# Patient Record
Sex: MALE | Race: WHITE | Employment: STUDENT | URBAN - METROPOLITAN AREA
[De-identification: names, ages, dates, MRNs, and addresses within clinical notes are randomized per-mention and may not be internally consistent; named-entity substitution may affect disease eponyms.]

---
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
## 2023-03-08 ENCOUNTER — OFFICE VISIT (OUTPATIENT)
Dept: FAMILY MEDICINE CLINIC | Facility: CLINIC | Age: 18
End: 2023-03-08
Payer: COMMERCIAL

## 2023-03-08 VITALS
TEMPERATURE: 98 F | RESPIRATION RATE: 18 BRPM | OXYGEN SATURATION: 99 % | SYSTOLIC BLOOD PRESSURE: 108 MMHG | BODY MASS INDEX: 38 KG/M2 | WEIGHT: 275.13 LBS | HEART RATE: 102 BPM | DIASTOLIC BLOOD PRESSURE: 72 MMHG

## 2023-03-08 DIAGNOSIS — N45.1 EPIDIDYMITIS: Primary | ICD-10-CM

## 2023-03-08 DIAGNOSIS — N50.811 TESTICULAR PAIN, RIGHT: ICD-10-CM

## 2023-03-08 PROCEDURE — 3074F SYST BP LT 130 MM HG: CPT | Performed by: FAMILY MEDICINE

## 2023-03-08 PROCEDURE — 99214 OFFICE O/P EST MOD 30 MIN: CPT | Performed by: FAMILY MEDICINE

## 2023-03-08 PROCEDURE — 3078F DIAST BP <80 MM HG: CPT | Performed by: FAMILY MEDICINE

## 2023-03-08 RX ORDER — CIPROFLOXACIN 500 MG/1
500 TABLET, FILM COATED ORAL 2 TIMES DAILY
Qty: 14 TABLET | Refills: 0 | Status: SHIPPED | OUTPATIENT
Start: 2023-03-08 | End: 2023-03-15

## 2023-03-08 RX ORDER — CIPROFLOXACIN 500 MG/1
500 TABLET, FILM COATED ORAL 2 TIMES DAILY
Qty: 14 TABLET | Refills: 0 | Status: SHIPPED | OUTPATIENT
Start: 2023-03-08 | End: 2023-03-08

## 2023-09-26 ENCOUNTER — MED REC SCAN ONLY (OUTPATIENT)
Dept: FAMILY MEDICINE CLINIC | Facility: CLINIC | Age: 18
End: 2023-09-26

## 2023-12-20 ENCOUNTER — OFFICE VISIT (OUTPATIENT)
Dept: FAMILY MEDICINE CLINIC | Facility: CLINIC | Age: 18
End: 2023-12-20
Payer: COMMERCIAL

## 2023-12-20 VITALS
DIASTOLIC BLOOD PRESSURE: 80 MMHG | TEMPERATURE: 98 F | BODY MASS INDEX: 40.09 KG/M2 | SYSTOLIC BLOOD PRESSURE: 124 MMHG | OXYGEN SATURATION: 99 % | HEIGHT: 70 IN | RESPIRATION RATE: 18 BRPM | HEART RATE: 84 BPM | WEIGHT: 280 LBS

## 2023-12-20 DIAGNOSIS — J45.909 ASTHMA, UNSPECIFIED ASTHMA SEVERITY, UNSPECIFIED WHETHER COMPLICATED, UNSPECIFIED WHETHER PERSISTENT: ICD-10-CM

## 2023-12-20 DIAGNOSIS — R09.81 SINUS CONGESTION: Primary | ICD-10-CM

## 2023-12-20 PROCEDURE — 3079F DIAST BP 80-89 MM HG: CPT | Performed by: NURSE PRACTITIONER

## 2023-12-20 PROCEDURE — 3008F BODY MASS INDEX DOCD: CPT | Performed by: NURSE PRACTITIONER

## 2023-12-20 PROCEDURE — 3074F SYST BP LT 130 MM HG: CPT | Performed by: NURSE PRACTITIONER

## 2023-12-20 PROCEDURE — 99213 OFFICE O/P EST LOW 20 MIN: CPT | Performed by: NURSE PRACTITIONER

## 2023-12-20 RX ORDER — AZITHROMYCIN 250 MG/1
TABLET, FILM COATED ORAL
Qty: 6 TABLET | Refills: 0 | Status: SHIPPED | OUTPATIENT
Start: 2023-12-20 | End: 2023-12-25

## 2023-12-20 RX ORDER — MELOXICAM 15 MG/1
TABLET ORAL
COMMUNITY
Start: 2023-10-27

## 2023-12-20 RX ORDER — TIZANIDINE HYDROCHLORIDE 4 MG/1
1 CAPSULE, GELATIN COATED ORAL 3 TIMES DAILY
COMMUNITY

## 2023-12-20 RX ORDER — ALBUTEROL SULFATE 90 UG/1
2 AEROSOL, METERED RESPIRATORY (INHALATION) EVERY 4 HOURS PRN
Qty: 18 G | Refills: 0 | Status: SHIPPED | OUTPATIENT
Start: 2023-12-20 | End: 2024-07-17

## 2024-02-02 ENCOUNTER — MED REC SCAN ONLY (OUTPATIENT)
Dept: FAMILY MEDICINE CLINIC | Facility: CLINIC | Age: 19
End: 2024-02-02

## 2024-08-15 ENCOUNTER — OFFICE VISIT (OUTPATIENT)
Dept: FAMILY MEDICINE CLINIC | Facility: CLINIC | Age: 19
End: 2024-08-15
Payer: COMMERCIAL

## 2024-08-15 VITALS
HEIGHT: 68 IN | BODY MASS INDEX: 42.89 KG/M2 | TEMPERATURE: 98 F | OXYGEN SATURATION: 99 % | WEIGHT: 283 LBS | RESPIRATION RATE: 16 BRPM | DIASTOLIC BLOOD PRESSURE: 72 MMHG | SYSTOLIC BLOOD PRESSURE: 112 MMHG | HEART RATE: 94 BPM

## 2024-08-15 DIAGNOSIS — Z00.00 ROUTINE HEALTH MAINTENANCE: ICD-10-CM

## 2024-08-15 DIAGNOSIS — N34.2 URETHRITIS: ICD-10-CM

## 2024-08-15 DIAGNOSIS — H15.101 EPISCLERITIS OF RIGHT EYE: Primary | ICD-10-CM

## 2024-08-15 PROBLEM — M54.16 LUMBAR RADICULOPATHY: Status: ACTIVE | Noted: 2023-07-28

## 2024-08-15 LAB
ALBUMIN SERPL-MCNC: 5.1 G/DL (ref 3.2–4.8)
ALBUMIN/GLOB SERPL: 1.6 {RATIO} (ref 1–2)
ALP LIVER SERPL-CCNC: 68 U/L
ALT SERPL-CCNC: 44 U/L
ANION GAP SERPL CALC-SCNC: 8 MMOL/L (ref 0–18)
AST SERPL-CCNC: 43 U/L (ref ?–34)
BASOPHILS # BLD AUTO: 0.07 X10(3) UL (ref 0–0.2)
BASOPHILS NFR BLD AUTO: 1.1 %
BILIRUB SERPL-MCNC: 0.5 MG/DL (ref 0.3–1.2)
BUN BLD-MCNC: 11 MG/DL (ref 9–23)
CALCIUM BLD-MCNC: 10.6 MG/DL (ref 8.7–10.4)
CHLORIDE SERPL-SCNC: 105 MMOL/L (ref 98–112)
CHOLEST SERPL-MCNC: 233 MG/DL (ref ?–200)
CO2 SERPL-SCNC: 26 MMOL/L (ref 21–32)
CREAT BLD-MCNC: 1.07 MG/DL
EGFRCR SERPLBLD CKD-EPI 2021: 101 ML/MIN/1.73M2 (ref 60–?)
EOSINOPHIL # BLD AUTO: 0.1 X10(3) UL (ref 0–0.7)
EOSINOPHIL NFR BLD AUTO: 1.6 %
ERYTHROCYTE [DISTWIDTH] IN BLOOD BY AUTOMATED COUNT: 12.2 %
FASTING PATIENT LIPID ANSWER: YES
FASTING STATUS PATIENT QL REPORTED: YES
GLOBULIN PLAS-MCNC: 3.1 G/DL (ref 2–3.5)
GLUCOSE BLD-MCNC: 92 MG/DL (ref 70–99)
HCT VFR BLD AUTO: 48.8 %
HDLC SERPL-MCNC: 48 MG/DL (ref 40–59)
HGB BLD-MCNC: 16.6 G/DL
IMM GRANULOCYTES # BLD AUTO: 0.03 X10(3) UL (ref 0–1)
IMM GRANULOCYTES NFR BLD: 0.5 %
LDLC SERPL CALC-MCNC: 161 MG/DL (ref ?–100)
LYMPHOCYTES # BLD AUTO: 2.5 X10(3) UL (ref 1–4)
LYMPHOCYTES NFR BLD AUTO: 39.6 %
MCH RBC QN AUTO: 30.4 PG (ref 26–34)
MCHC RBC AUTO-ENTMCNC: 34 G/DL (ref 31–37)
MCV RBC AUTO: 89.4 FL
MONOCYTES # BLD AUTO: 0.45 X10(3) UL (ref 0.1–1)
MONOCYTES NFR BLD AUTO: 7.1 %
NEUTROPHILS # BLD AUTO: 3.17 X10 (3) UL (ref 1.5–7.7)
NEUTROPHILS # BLD AUTO: 3.17 X10(3) UL (ref 1.5–7.7)
NEUTROPHILS NFR BLD AUTO: 50.1 %
NONHDLC SERPL-MCNC: 185 MG/DL (ref ?–130)
OSMOLALITY SERPL CALC.SUM OF ELEC: 287 MOSM/KG (ref 275–295)
PLATELET # BLD AUTO: 357 10(3)UL (ref 150–450)
POTASSIUM SERPL-SCNC: 4.2 MMOL/L (ref 3.5–5.1)
PROT SERPL-MCNC: 8.2 G/DL (ref 5.7–8.2)
RBC # BLD AUTO: 5.46 X10(6)UL
SODIUM SERPL-SCNC: 139 MMOL/L (ref 136–145)
T4 FREE SERPL-MCNC: 1.5 NG/DL (ref 0.8–1.7)
TRIGL SERPL-MCNC: 134 MG/DL (ref 30–149)
TSI SER-ACNC: 1.49 MIU/ML (ref 0.55–4.78)
VLDLC SERPL CALC-MCNC: 26 MG/DL (ref 0–30)
WBC # BLD AUTO: 6.3 X10(3) UL (ref 4–11)

## 2024-08-15 PROCEDURE — 3078F DIAST BP <80 MM HG: CPT | Performed by: FAMILY MEDICINE

## 2024-08-15 PROCEDURE — 3008F BODY MASS INDEX DOCD: CPT | Performed by: FAMILY MEDICINE

## 2024-08-15 PROCEDURE — 99395 PREV VISIT EST AGE 18-39: CPT | Performed by: FAMILY MEDICINE

## 2024-08-15 PROCEDURE — 3074F SYST BP LT 130 MM HG: CPT | Performed by: FAMILY MEDICINE

## 2024-08-15 PROCEDURE — 84439 ASSAY OF FREE THYROXINE: CPT | Performed by: FAMILY MEDICINE

## 2024-08-15 PROCEDURE — 84443 ASSAY THYROID STIM HORMONE: CPT | Performed by: FAMILY MEDICINE

## 2024-08-15 PROCEDURE — 80061 LIPID PANEL: CPT | Performed by: FAMILY MEDICINE

## 2024-08-15 PROCEDURE — 80053 COMPREHEN METABOLIC PANEL: CPT | Performed by: FAMILY MEDICINE

## 2024-08-15 PROCEDURE — 85025 COMPLETE CBC W/AUTO DIFF WBC: CPT | Performed by: FAMILY MEDICINE

## 2024-08-15 RX ORDER — ALBUTEROL SULFATE 90 UG/1
2 AEROSOL, METERED RESPIRATORY (INHALATION) EVERY 4 HOURS PRN
COMMUNITY

## 2024-08-15 RX ORDER — TAMSULOSIN HYDROCHLORIDE 0.4 MG/1
1 CAPSULE ORAL
COMMUNITY
Start: 2024-03-06 | End: 2024-08-15 | Stop reason: ALTCHOICE

## 2024-08-15 NOTE — PROGRESS NOTES
Satish Wolf is a 19 year old male who presents for a complete physical exam.   HPI:   Pt complains of nothing at thi time. He passed a kidney stone earlier this year. He also had a low back injury and did not have to have surgery.he has been trying to lose weight admits he's a . Not really tracking calories and is trying to get more exercise, has lost 7 pounds. Has some physical restrictions due to his back. Also continues to have episodes of scleritis, and urethritis. He was worked up a number of years ago for connective tissue disorder but, it all came back negative. Has not had any labs for a while.  Immunization History   Administered Date(s) Administered    Covid-19 Vaccine Pfizer 30 mcg/0.3 ml 09/03/2021, 09/24/2021    DTAP 02/02/2005, 08/04/2008    DTAP/HEP B/IPV Combined 10/08/2003, 12/08/2003, 02/09/2004    Hib, Unspecified Formulation 10/08/2003, 12/08/2003, 02/09/2004, 11/10/2004    IPV 08/04/2008    Influenza 10/08/2003, 12/08/2003, 02/09/2004, 11/10/2004    MMR 11/10/2004, 08/04/2008    Pneumococcal (Prevnar 7) 10/08/2003, 02/26/2004, 08/09/2004    Varicella 02/02/2005, 08/04/2008     Wt Readings from Last 6 Encounters:   12/20/23 280 lb (127 kg)   03/08/23 275 lb 2 oz (124.8 kg) (>99%, Z= 2.77)*   12/24/22 260 lb (117.9 kg) (>99%, Z= 2.56)*   12/21/22 260 lb (117.9 kg) (>99%, Z= 2.56)*   10/12/22 263 lb 14.3 oz (119.7 kg) (>99%, Z= 2.62)*   10/12/22 264 lb (119.7 kg) (>99%, Z= 2.62)*     * Growth percentiles are based on CDC (Boys, 2-20 Years) data.     There is no height or weight on file to calculate BMI.     Lab Results   Component Value Date    GLU 86 07/13/2022    GLU 77 05/13/2016     No results found for: \"CHOLEST\"  No results found for: \"HDL\"  No results found for: \"LDL\"  Lab Results   Component Value Date    AST 14 (L) 07/13/2022    AST 25 05/13/2016     Lab Results   Component Value Date    ALT 23 07/13/2022    ALT 21 05/13/2016     No results found for: \"PSA\"      Current Outpatient Medications   Medication Sig Dispense Refill    tamsulosin 0.4 MG Oral Cap Take 1 capsule (0.4 mg total) by mouth After dinner.      albuterol 108 (90 Base) MCG/ACT Inhalation Aero Soln Inhale 2 puffs into the lungs every 4 (four) hours as needed.        Past Medical History:    Asthma (HCC)    Seizures (HCC)      Past Surgical History:   Procedure Laterality Date    Appendectomy  11/5/15    [facility] Dr. Moore    Appendectomy      Tonsillectomy        Family History   Problem Relation Age of Onset    Heart Disease Paternal Grandfather     Other (rheumatoid arthritis) Father     Cancer Neg     Stroke Neg       Social History:  Social History     Socioeconomic History    Marital status: Single   Tobacco Use    Smoking status: Never    Smokeless tobacco: Never   Vaping Use    Vaping status: Never Used   Substance and Sexual Activity    Alcohol use: Never    Drug use: Never     Social Determinants of Health      [facility]    Social Connections    Received from [facility]    Housing Stability      Occ: student. : single. Children: none.   Exercise:  4 times per week.  Diet: watches minimally     REVIEW OF SYSTEMS:   GENERAL: feels well otherwise  SKIN: denies any unusual skin lesions  EYES:denies blurred vision or double vision  HEENT: denies nasal congestion, sinus pain or ST  LUNGS: denies shortness of breath with exertion  CARDIOVASCULAR: denies chest pain on exertion  GI: denies abdominal pain,denies heartburn  : denies nocturia or changes in stream  MUSCULOSKELETAL: denies back pain  NEURO: denies headaches  PSYCHE: denies depression or anxiety  HEMATOLOGIC: denies hx of anemia  ENDOCRINE: denies thyroid history  ALL/ASTHMA: denies hx of allergy or asthma    EXAM:   There were no vitals taken for this visit.  There is no height or weight on file to calculate BMI.   GENERAL: well developed, well  nourished,in no apparent distress  SKIN: no rashes,no suspicious lesions  HEENT: atraumatic, normocephalic,ears and throat are clear  EYES:PERRLA, EOMI, normal optic disk,conjunctiva are clear  NECK: supple,no adenopathy,no bruits  CHEST: no chest tenderness  LUNGS: clear to auscultation  CARDIO: RRR without murmur  GI: good BS's,no masses, HSM or tenderness  : two descended testes,no masses,no hernia,no penile lesions  MUSCULOSKELETAL: back is not tender,FROM of the back  EXTREMITIES: no cyanosis, clubbing or edema  NEURO: Oriented times three,cranial nerves are intact,motor and sensory are grossly intact    ASSESSMENT AND PLAN:   Satish Wolf is a 19 year old male who presents for a complete physical exam.  Pt's weight is There is no height or weight on file to calculate BMI., recommended low fat diet and aerobic exercise 30 minutes three times weekly. Health maintenance, will check fasting Lipids, CMP, CBC and PSA. Pt referred for screening colonoscopy. Pt info handouts given for: exercise, low fat diet, testicular self exam and prostate cancer screening. The patient indicates understanding of these issues and agrees to the plan.  The patient is asked to return for CPX in 1 year  Encounter Diagnoses   Name Primary?    Episcleritis of right eye Yes    Urethritis     Routine health maintenance        Orders Placed This Encounter   Procedures    CBC W Differential W Platelet [E]    Comp Metabolic Panel (14) [E]    Lipid Panel [E]    TSH and Free T4 [E]     INSTRUCTED TO TRY AND GET 5 SERVINGS OF FRUITS AND VEGETABLES DAILY keep caloric intake between 6478-5025 calories daily, try and get 100 grams of protein daily  4 SERVINGS OF WATER  3 SERVINGS OF LOW FAT DAIRY DAILY  AND 60 MINUTES OF PHYSICAL ACTIVITY A DAY  RTC in 8 weeks to recheck weight      Imaging & Consults:  RHEUMATOLOGY - INTERNAL  OP REFERRAL TO OPHTHALMOLOGY  .

## 2024-08-16 DIAGNOSIS — E78.1 PURE HYPERTRIGLYCERIDEMIA: Primary | ICD-10-CM

## 2024-08-16 DIAGNOSIS — E78.00 PURE HYPERCHOLESTEROLEMIA: ICD-10-CM

## 2025-01-08 ENCOUNTER — HOSPITAL ENCOUNTER (OUTPATIENT)
Age: 20
Discharge: HOME | End: 2025-01-08
Payer: COMMERCIAL

## 2025-01-08 VITALS
HEIGHT: 70 IN | HEART RATE: 90 BPM | WEIGHT: 270 LBS | BODY MASS INDEX: 38.65 KG/M2 | RESPIRATION RATE: 20 BRPM | TEMPERATURE: 99 F | OXYGEN SATURATION: 98 % | DIASTOLIC BLOOD PRESSURE: 50 MMHG | SYSTOLIC BLOOD PRESSURE: 102 MMHG

## 2025-01-08 DIAGNOSIS — A08.4 VIRAL GASTROENTERITIS: Primary | ICD-10-CM

## 2025-01-08 LAB
#MXD IC: 0.4 X10ˆ3/UL (ref 0.1–1)
BUN BLD-MCNC: 12 MG/DL (ref 7–18)
CHLORIDE BLD-SCNC: 104 MMOL/L (ref 98–112)
CLARITY UR: CLEAR
CO2 BLD-SCNC: 24 MMOL/L (ref 21–32)
COLOR UR: YELLOW
CREAT BLD-MCNC: 1.1 MG/DL
EGFRCR SERPLBLD CKD-EPI 2021: 98 ML/MIN/1.73M2 (ref 60–?)
GLUCOSE BLD-MCNC: 93 MG/DL (ref 70–99)
GLUCOSE UR STRIP-MCNC: NEGATIVE MG/DL
HCT VFR BLD AUTO: 44.7 %
HCT VFR BLD CALC: 46 %
HGB BLD-MCNC: 15.1 G/DL
ISTAT IONIZED CALCIUM FOR CHEM 8: 1.16 MMOL/L (ref 1.12–1.32)
KETONES UR STRIP-MCNC: >=160 MG/DL
LEUKOCYTE ESTERASE UR QL STRIP: NEGATIVE
LYMPHOCYTES # BLD AUTO: 1.1 X10ˆ3/UL (ref 1–4)
LYMPHOCYTES NFR BLD AUTO: 24.5 %
MCH RBC QN AUTO: 30.3 PG (ref 26–34)
MCHC RBC AUTO-ENTMCNC: 33.8 G/DL (ref 31–37)
MCV RBC AUTO: 89.6 FL (ref 80–100)
MIXED CELL %: 7.8 %
NEUTROPHILS # BLD AUTO: 3.1 X10ˆ3/UL (ref 1.5–7.7)
NEUTROPHILS NFR BLD AUTO: 67.7 %
NITRITE UR QL STRIP: NEGATIVE
PH UR STRIP: 6 [PH]
PLATELET # BLD AUTO: 244 X10ˆ3/UL (ref 150–450)
POCT INFLUENZA A: NEGATIVE
POCT INFLUENZA B: NEGATIVE
POTASSIUM BLD-SCNC: 4.1 MMOL/L (ref 3.6–5.1)
RBC # BLD AUTO: 4.99 X10ˆ6/UL
SODIUM BLD-SCNC: 140 MMOL/L (ref 136–145)
SP GR UR STRIP: 1.02
UROBILINOGEN UR STRIP-ACNC: <2 MG/DL
WBC # BLD AUTO: 4.6 X10ˆ3/UL (ref 4–11)

## 2025-01-08 PROCEDURE — 85025 COMPLETE CBC W/AUTO DIFF WBC: CPT | Performed by: NURSE PRACTITIONER

## 2025-01-08 PROCEDURE — S0028 INJECTION, FAMOTIDINE, 20 MG: HCPCS | Performed by: NURSE PRACTITIONER

## 2025-01-08 PROCEDURE — 99214 OFFICE O/P EST MOD 30 MIN: CPT | Performed by: NURSE PRACTITIONER

## 2025-01-08 PROCEDURE — 96367 TX/PROPH/DG ADDL SEQ IV INF: CPT | Performed by: NURSE PRACTITIONER

## 2025-01-08 PROCEDURE — 87502 INFLUENZA DNA AMP PROBE: CPT | Performed by: NURSE PRACTITIONER

## 2025-01-08 PROCEDURE — 96365 THER/PROPH/DIAG IV INF INIT: CPT | Performed by: NURSE PRACTITIONER

## 2025-01-08 PROCEDURE — 81002 URINALYSIS NONAUTO W/O SCOPE: CPT | Performed by: NURSE PRACTITIONER

## 2025-01-08 PROCEDURE — 80047 BASIC METABLC PNL IONIZED CA: CPT | Performed by: NURSE PRACTITIONER

## 2025-01-08 RX ORDER — ONDANSETRON 2 MG/ML
4 INJECTION INTRAMUSCULAR; INTRAVENOUS ONCE
Status: COMPLETED | OUTPATIENT
Start: 2025-01-08 | End: 2025-01-08

## 2025-01-08 RX ORDER — FAMOTIDINE 10 MG/ML
20 INJECTION, SOLUTION INTRAVENOUS ONCE
Status: COMPLETED | OUTPATIENT
Start: 2025-01-08 | End: 2025-01-08

## 2025-01-08 RX ORDER — ONDANSETRON 4 MG/1
4 TABLET, ORALLY DISINTEGRATING ORAL EVERY 8 HOURS PRN
COMMUNITY

## 2025-01-08 RX ORDER — SODIUM CHLORIDE 9 MG/ML
1000 INJECTION, SOLUTION INTRAVENOUS ONCE
Status: COMPLETED | OUTPATIENT
Start: 2025-01-08 | End: 2025-01-08

## 2025-01-08 RX ORDER — ONDANSETRON 4 MG/1
4 TABLET, ORALLY DISINTEGRATING ORAL EVERY 6 HOURS PRN
Qty: 20 TABLET | Refills: 0 | Status: SHIPPED | OUTPATIENT
Start: 2025-01-08 | End: 2025-01-15

## 2025-01-08 NOTE — ED QUICK NOTES
Pt co pain in arm above iv site. Iv assessed and no signs of inflitration. Iv flushes with ease, with blood return. Tubing repositioned and co of pain stopped.

## 2025-01-08 NOTE — DISCHARGE INSTRUCTIONS
Rest and push plenty of fluids.   Take it easy on your stomach over the next few days.   Keep to a bland diet and avoid any spicy, greasy, citrus, or fried foods.   Use the Zofran as needed for nasuea/vomiting.   Take Pepcid over the counter 20 mg twice a day for the next few days.   Follow up with your PCP in 3-5 days.     Thank you for choosing [facility] for your care.

## 2025-01-08 NOTE — ED PROVIDER NOTES
Patient Seen in: [facility]      History     Chief Complaint   Patient presents with    Abdomen/Flank Pain    Nausea/Vomiting/Diarrhea    Fever     Stated Complaint: stomach pain    Subjective:   20 yo male presents to the urgent care with c/o vomiting.  Patient states he started yesterday with low grade fevers, nausea, vomiting, diarrhea, and crampy abdominal pain.  He last vomited in the middle of the night last night, but has continued to be very nauseated today.  He has already had 4 watery diarrheal stools this morning.  He states he has not been able to keep anything down in the last 24 hours.  He denies any nasal congestion, runny nose, cough, sore throat, shortness of breath, chest pain, or urinary symptoms.     The history is provided by the patient.         Objective:     Past Medical History:    Asthma (HCC)    Kidney stone    Seizures (HCC)              Past Surgical History:   Procedure Laterality Date    Appendectomy  11/5/15    [facility] [address]    Appendectomy      Tonsillectomy                  Social History     Socioeconomic History    Marital status: Single   Tobacco Use    Smoking status: Never    Smokeless tobacco: Never   Vaping Use    Vaping status: Never Used   Substance and Sexual Activity    Alcohol use: Never    Drug use: Never     Social Drivers of Health      [facility]    Social Connections    Received from [facility]    Housing Stability              Review of Systems   Constitutional:  Positive for chills, fatigue and fever.   HENT: Negative.     Respiratory: Negative.     Cardiovascular: Negative.    Gastrointestinal:  Positive for abdominal pain, diarrhea, nausea and vomiting.   Genitourinary: Negative.    All other systems reviewed and are negative.      Positive for stated complaint: stomach pain  Other systems are as noted in HPI.  Constitutional and vital signs reviewed.      All  other systems reviewed and negative except as noted above.    Physical Exam     ED Triage Vitals [01/08/25 1023]   /64   Pulse 96   Resp 20   Temp 98.8 °F (37.1 °C)   Temp src Oral   SpO2 97 %   O2 Device None (Room air)       Current Vitals:   Vital Signs  BP: 102/50  Pulse: 90  Resp: 20  Temp: 98.8 °F (37.1 °C)  Temp src: Oral    Oxygen Therapy  SpO2: 98 %  O2 Device: None (Room air)        Physical Exam  Vitals and nursing note reviewed.   Constitutional:       General: He is not in acute distress.     Appearance: He is well-developed. He is obese. He is not ill-appearing.   HENT:      Head: Normocephalic and atraumatic.      Mouth/Throat:      Mouth: Mucous membranes are moist.      Pharynx: Oropharynx is clear.   Eyes:      Pupils: Pupils are equal, round, and reactive to light.   Cardiovascular:      Rate and Rhythm: Normal rate and regular rhythm.      Heart sounds: Normal heart sounds.   Pulmonary:      Effort: Pulmonary effort is normal. No respiratory distress.      Breath sounds: Normal breath sounds.   Abdominal:      General: Abdomen is protuberant. Bowel sounds are normal. There is no distension.      Palpations: Abdomen is soft.      Tenderness: There is generalized abdominal tenderness. There is no right CVA tenderness, left CVA tenderness or guarding.      Comments: Mild generalized tenderness.    Skin:     General: Skin is warm and dry.   Neurological:      General: No focal deficit present.      Mental Status: He is alert and oriented to person, place, and time.   Psychiatric:         Mood and Affect: Mood normal.         Behavior: Behavior normal.           ED Course     Labs Reviewed   [facility] POCT URINALYSIS DIPSTICK - Abnormal; Notable for the following components:       Result Value    Protein urine Trace (*)     Ketone, Urine >=160 (*)     Bilirubin, Urine Small (*)     Blood, Urine Trace-Intact (*)     All other components within normal limits   POCT ISTAT CHEM8 CARTRIDGE - Normal   POCT  FLU TEST - Normal    Narrative:     This assay is a rapid molecular in vitro test utilizing nucleic acid amplification of influenza A and B viral RNA.   POCT CBC       ED Course as of 01/08/25 1238  ------------------------------------------------------------  Time: 01/08 1048  Value: POCT Flu Test  Comment: Negative.   ------------------------------------------------------------  Time: 01/08 1116  Value: POCT CBC  Comment: No elevated WBC or leukocytosis.  H&H is stable at 15.1 and 44.7.  ------------------------------------------------------------  Time: 01/08 1122  Value: POCT ISTAT chem8 cartridge  Comment: Unremarkable and essentially normal.   ------------------------------------------------------------  Time: 01/08 1135  Value: POCT Urinalysis Dipstick(!)  Comment: Trace blood, ketones and trace protein.  No indication of infection.  Ketones and bilirubin are likely due to vomiting and diarrhea.            MDM            Medical Decision Making  18 yo male with nausea, vomiting and diarrhea.  Flu, CBC, Chem8, UA, IV fluids, Zofran and Pepcid ordered.      Patient states he feels much better after medications and IV fluids.  P.o. fluid challenge given.  Patient is able to drink without emesis.  Will prescribe Zofran for home.  Patient can use Pepcid over-the-counter.  Flu is negative.  CBC shows no elevated WBC or leukocytosis.  H&H is stable.  Chem-8 is unremarkable with normal kidney function and electrolytes.  UA is consistent with nutritional changes, but no infection.  Feel this is likely a viral gastroenteritis could be due to norovirus.  No evidence of sepsis, UTI, pyelonephritis, bacterial colitis, diverticulitis, or other bacterial etiology.  Patient follow-up with his PCP return as needed.    Amount and/or Complexity of Data Reviewed  Labs: ordered. Decision-making details documented in ED Course.    Risk  OTC drugs.  Prescription drug management.        Disposition and Plan     Clinical  Impression:  1. Viral gastroenteritis         Disposition:  Discharge  1/8/2025 12:37 pm    Follow-up:  Anthony Vizcaino,   [address]  [place] [address]  [phone]    In 1 week  As needed          Medications Prescribed:  Current Discharge Medication List        START taking these medications    Details   !! ondansetron 4 MG Oral Tablet Dispersible Take 1 tablet (4 mg total) by mouth every 6 (six) hours as needed.  Qty: 20 tablet, Refills: 0       !! - Potential duplicate medications found. Please discuss with provider.              Supplementary Documentation:

## 2025-01-08 NOTE — ED INITIAL ASSESSMENT (HPI)
Pt with co abd pain nausea vomiting and diarrhea for the last 2 days. Pt also with fever. Otc meds with no relief.

## 2025-01-17 ENCOUNTER — OFFICE VISIT (OUTPATIENT)
Dept: RHEUMATOLOGY | Facility: CLINIC | Age: 20
End: 2025-01-17
Payer: COMMERCIAL

## 2025-01-17 VITALS
OXYGEN SATURATION: 97 % | WEIGHT: 292 LBS | TEMPERATURE: 99 F | BODY MASS INDEX: 41.8 KG/M2 | HEIGHT: 70 IN | HEART RATE: 104 BPM | RESPIRATION RATE: 16 BRPM | SYSTOLIC BLOOD PRESSURE: 108 MMHG | DIASTOLIC BLOOD PRESSURE: 64 MMHG

## 2025-01-17 DIAGNOSIS — E66.813 CLASS 3 SEVERE OBESITY WITHOUT SERIOUS COMORBIDITY WITH BODY MASS INDEX (BMI) OF 40.0 TO 44.9 IN ADULT, UNSPECIFIED OBESITY TYPE (HCC): ICD-10-CM

## 2025-01-17 DIAGNOSIS — Z86.69 HISTORY OF EPISCLERITIS: ICD-10-CM

## 2025-01-17 DIAGNOSIS — M54.89 INFLAMMATORY BACK PAIN: Primary | ICD-10-CM

## 2025-01-17 DIAGNOSIS — E66.01 CLASS 3 SEVERE OBESITY WITHOUT SERIOUS COMORBIDITY WITH BODY MASS INDEX (BMI) OF 40.0 TO 44.9 IN ADULT, UNSPECIFIED OBESITY TYPE (HCC): ICD-10-CM

## 2025-01-17 DIAGNOSIS — L83 ACANTHOSIS NIGRICANS: ICD-10-CM

## 2025-01-17 DIAGNOSIS — R63.5 ABNORMAL WEIGHT GAIN: ICD-10-CM

## 2025-01-17 DIAGNOSIS — I73.00 RAYNAUD'S DISEASE WITHOUT GANGRENE: ICD-10-CM

## 2025-01-17 DIAGNOSIS — Z82.61 FAMILY HISTORY OF RHEUMATOID ARTHRITIS: ICD-10-CM

## 2025-01-17 DIAGNOSIS — M51.360 DEGENERATION OF INTERVERTEBRAL DISC OF LUMBAR REGION WITH DISCOGENIC BACK PAIN: ICD-10-CM

## 2025-01-17 DIAGNOSIS — M25.50 POLYARTHRALGIA: ICD-10-CM

## 2025-01-17 PROCEDURE — 3074F SYST BP LT 130 MM HG: CPT | Performed by: INTERNAL MEDICINE

## 2025-01-17 PROCEDURE — 3078F DIAST BP <80 MM HG: CPT | Performed by: INTERNAL MEDICINE

## 2025-01-17 PROCEDURE — 3008F BODY MASS INDEX DOCD: CPT | Performed by: INTERNAL MEDICINE

## 2025-01-17 PROCEDURE — 99214 OFFICE O/P EST MOD 30 MIN: CPT | Performed by: INTERNAL MEDICINE

## 2025-01-17 NOTE — PROGRESS NOTES
?  RHEUMATOLOGY NEW PATIENT   Date of visit: 1/17/2025  ?  Chief Complaint   Patient presents with    [facility]     New pt. Dr. Vizcaino referral. Family history of RA. Back pain. Eye infections that link with urinary issues. No rashes. No swelling. Stiffness in the morning, mostly in lower back. Converted rapid score of 4.3       ?  ASSESSMENT, DISCUSSION & PLAN   Assessment:  1. Inflammatory back pain    2. History of episcleritis    3. Abnormal weight gain    4. Class 3 severe obesity without serious comorbidity with body mass index (BMI) of 40.0 to 44.9 in adult, unspecified obesity type (HCC)    5. Acanthosis nigricans    6. Degeneration of intervertebral disc of lumbar region with discogenic back pain    7. Raynaud's disease without gangrene    8. Polyarthralgia    9. Family history of rheumatoid arthritis        Discussion:  Mr. Satish Wolf is a 20 yo gentleman who presents for evaluation due to worsened joint and back pain. He had been working until recently when he injured his back, was diagnosed with a bulging disc, completed PT but continues to have significant pain and has not been able to exercise/move due to concerns for worsening his pain. Unfortunately, he has gained significant weight during this time. He also has signs of acanthosis nigricans over his upper back/neck area. As part of his workup, will check an A1c and pt understands he will have to follow back with PCP if abnormal. Aside from the back, pt has been suffering from joint pain for several years. He also hs dry eyes, raynauds, possible livedo reticularis in addition to hand, feet and knee pain. He also has symptoms that seem to align with reactive arthritis with hx of episcleritis and urethritis (with out UTI) but prior extensive work up negative including VICKIE, RF/CCP, HLAB27. Will perform thorough autoimmune serologies as well as get updated imaging and go from there.  He is okay with holding off on medication  intervention until work up completed. We briefly discussed some treatment options but since differential is currently wide, will be able to give further instructions once work up completed.  He will likely need a 2w follow to discuss in more detail.    Patient verbalized understanding of above instructions. No further questions at this time.    Code selection for this visit was based on time spent (48min) on date of service in preparing to see the patient, obtaining and/or reviewing separately obtained history, performing a medically appropriate examination, counseling and educating the patient/family/caregiver, ordering medications or testing, referring and communicating with other healthcare providers, documenting clinical information in the E HR, independently interpreting results and communicating results to the patient/family/caregiver and care coordination with the patient's other providers.    ?  Plan:  Diagnoses and all orders for this visit:    Inflammatory back pain  -     C-Reactive Protein; Future  -     Sed Rate, Westergren (Automated); Future  -     XR CERVICAL SPINE (2-3 VIEWS) (CPT=72040); Future  -     XR THORACIC SPINE (3 VIEWS) (CPT=72072); Future  -     XR SACROILIAC JOINTS (MIN 3 VIEWS) (CPT=72202); Future    History of episcleritis  -     C-Reactive Protein; Future  -     Sed Rate, Westergren (Automated); Future  -     Rheumatoid Arthritis Factor; Future  -     Cyclic Citrullinate Pep. IGG; Future  -     Anti-Nuclear Antibody (VICKIE) by IFA, Reflex Titer + Specific Antibodies; Future  -     Hemoglobin A1C; Future  -     XR CERVICAL SPINE (2-3 VIEWS) (CPT=72040); Future  -     XR THORACIC SPINE (3 VIEWS) (CPT=72072); Future  -     XR SACROILIAC JOINTS (MIN 3 VIEWS) (CPT=72202); Future    Abnormal weight gain  -     Hemoglobin A1C; Future    Class 3 severe obesity without serious comorbidity with body mass index (BMI) of 40.0 to 44.9 in adult, unspecified obesity type (HCC)  -     Hemoglobin A1C;  Future    Acanthosis nigricans  -     Hemoglobin A1C; Future    Degeneration of intervertebral disc of lumbar region with discogenic back pain    Raynaud's disease without gangrene  -     C-Reactive Protein; Future  -     Sed Rate, Westergren (Automated); Future  -     Rheumatoid Arthritis Factor; Future  -     Cyclic Citrullinate Pep. IGG; Future  -     Anti-Nuclear Antibody (VICKIE) by IFA, Reflex Titer + Specific Antibodies; Future  -     Hemoglobin A1C; Future  -     XR CERVICAL SPINE (2-3 VIEWS) (CPT=72040); Future  -     XR THORACIC SPINE (3 VIEWS) (CPT=72072); Future  -     XR SACROILIAC JOINTS (MIN 3 VIEWS) (CPT=72202); Future    Polyarthralgia  -     C-Reactive Protein; Future  -     Sed Rate, Westergren (Automated); Future  -     Rheumatoid Arthritis Factor; Future  -     Cyclic Citrullinate Pep. IGG; Future  -     Anti-Nuclear Antibody (VICKIE) by IFA, Reflex Titer + Specific Antibodies; Future  -     Hemoglobin A1C; Future  -     XR CERVICAL SPINE (2-3 VIEWS) (CPT=72040); Future  -     XR THORACIC SPINE (3 VIEWS) (CPT=72072); Future  -     XR SACROILIAC JOINTS (MIN 3 VIEWS) (CPT=72202); Future    Family history of rheumatoid arthritis        Return in about 2 weeks (around 1/31/2025).  ?  HPI   Satish Wolf is a 19 year old male with the following active problems who is referred for rheumatologic evaluation due to joint and back pain. .     States early high school, was dealing with lower back tightness and stiffness. Seen by chiropractor without relief. Was taking advil/tylenol daily due to pain.  Hx of disc herniation about a year ago while at work- was taking meloxicam and muscle relaxant. Stopped recently to flush system. Also completed PT for 3 months (two months in a row- daily x 5 hours)  Tried dry needling without much relief.     End of high school- right eye vision went black. Seen by ophthalmology and exam was normal. Then a few months later, had redness and swelling- recommended rheum  and took steroids.   Then following episode, similar and told could be related to sleep.  Also has urinary symptoms like a UTI but testing would be negative.     Hx of episcleritis and urethritis  Hx of renal stones- no surgery required. Hx of hematuria as well. Denies tea colored urine or frothy urine.   Never placed on immunosuppressive medications    Continues with low back pain.  Feels hands/feet get cold easily and locking with pain.  Neck tightness  5x/year some left knee pain   Some color change of the hands- getting more pale and fingertips turning purple. Denies fingertip ulcers  Gets painful bumps on the hands and resolve on their own after a few days. Never drain.   Pain in back can wake from sleep at night- less now compared to last year, but still occurring.     + weight gain due to inactivity from the severity of the back pain. About 50# over the past year.     Denies skin rashes. Seen by dermatology for grey patches around neck/buttock- told acanthosis nigricans     + morning stiffness, previously 1-2 hours, now about 45 minutes, primarily the back and somewhat neck.   + frequent epistaxis   + hx of seizure at 1 years old- did not continue medication long term. And no recent issues.   + ?hx of lacy reticular pattern of the skin on the lower extremities- only one episode and no pain or long standing changes   + significant reflux- dx with hiatal hernia and thickening of distal esophagus- has not seen GI yet. Taking omeprazole and pepcid  + 3 episodes of sweating and hot and tinnitus along with dizziness-  not always on exertion. No workup by cards/neurology   + bumps/nodules over the shin. Not tender.   + sharp jaw pain- told by dentist not TMJ.     + dry/itchy eyes, using OTC drops. No hx of plugs.     Hx of asthma - albuterol as needed     No wrist pain or swelling, pain or swelling of the MCPs, pain or swelling of the ankles, or new skin nodule formation.  The patient denies hair loss, oral or nasal  ulcers, photosensitive rash, elevated or scarring rashes, prior hematologic abnormality, prior liver disease.  No history of prior blood clot in the legs or lungs, strokes or ischemic phenomenon.  Denies nonhealing ulcers on the fingertips, skin calcifications, trouble swallowing.  The patient denies any history crampy abdominal pain, bloating, constipation, diarrhea, bloody stools, Achilles heel pain or symptoms of enthesitis, psoriatic lesions, spooning or pitting of the nails, history of dactylitis.   There are no symptoms of severe dry mouth, recurrent cavities, or swelling of the cheeks or under the jawbone.   No fevers, lymphadenopathy, night sweats, unexpected weight loss, easy bruising or bleeding, or unexplained weakness.  Denies chronic sinus infections/disease.  Denies chronic cough or hemoptysis.     Family hx  Mother and maternal grandmother with hx of renal stones and GM with CKD   Father with seronegative RA on Enbrel       Past Medical History:  Past Medical History:    Asthma (HCC)    Kidney stone    Seizures (HCC)     Past Surgical History:  Past Surgical History:   Procedure Laterality Date    Appendectomy  11/5/15    [facility] Dr. Moore    Appendectomy      Tonsillectomy       Family History:  Family History   Problem Relation Age of Onset    Heart Disease Paternal Grandfather     Other (rheumatoid arthritis) Father     Cancer Neg     Stroke Neg      Social History:  Social History     Socioeconomic History    Marital status: Single   Tobacco Use    Smoking status: Never    Smokeless tobacco: Never   Vaping Use    Vaping status: Never Used   Substance and Sexual Activity    Alcohol use: Yes     Comment: weekends    Drug use: Never     Social Drivers of Health      Received from [facility], [facility]    Social Connections    Received from [facility]    Housing Stability     Medications:  Medications Taking[1]  Modified Medications    No  medications on file     There are no discontinued medications.  ?  ?  Allergies:  Allergies[2]  ?  REVIEW OF SYSTEMS   ?  Review of Systems   Constitutional:  Positive for chills and malaise/fatigue. Negative for fever and weight loss.   HENT:  Positive for nosebleeds and tinnitus.    Eyes:  Negative for pain and redness.        Can happen during flares   Respiratory:  Negative for cough, hemoptysis and shortness of breath.    Cardiovascular:  Negative for chest pain, palpitations and leg swelling.   Gastrointestinal:  Positive for heartburn and nausea. Negative for abdominal pain, blood in stool, constipation, diarrhea and vomiting.   Genitourinary:  Positive for hematuria. Negative for dysuria, frequency and urgency.        Can occur during flare   Musculoskeletal:  Positive for back pain, joint pain, myalgias and neck pain.   Skin:  Positive for itching and rash.   Neurological:  Positive for tingling (in the hands when cold/color changes). Negative for dizziness, seizures (childhood hx), weakness and headaches (prior ocular migraines).   Endo/Heme/Allergies:  Positive for environmental allergies. Does not bruise/bleed easily.     PHYSICAL EXAM   Today's Vitals:  Temperature Blood Pressure Heart Rate Resp Rate SpO2   Temp: 98.5 °F (36.9 °C) BP: 108/64 Pulse: 104 Resp: 16 SpO2: 97 %   ?  Current Weight Height BMI BSA Pain   Wt Readings from Last 1 Encounters:   01/17/25 292 lb (132.5 kg)    Height: 5' 10\" (177.8 cm) Body mass index is 41.9 kg/m². Body surface area is 2.45 meters squared.         Physical Exam  Vitals and nursing note reviewed.   Constitutional:       General: He is not in acute distress.     Appearance: He is well-developed. He is obese. He is not diaphoretic.   HENT:      Head: Normocephalic.   Eyes:      General: No scleral icterus.     Extraocular Movements: Extraocular movements intact.      Conjunctiva/sclera: Conjunctivae normal.   Neck:      Vascular: No JVD.      Trachea: No tracheal  deviation.   Cardiovascular:      Rate and Rhythm: Regular rhythm. Tachycardia present.      Heart sounds: Normal heart sounds. No murmur heard.  Pulmonary:      Effort: Pulmonary effort is normal. No respiratory distress.      Breath sounds: Normal breath sounds. No wheezing.   Musculoskeletal:         General: Tenderness present. No swelling.      Cervical back: Neck supple.      Comments: No evidence of heberden or johanny nodes of any of the fingers, no basilar joint tenderness of the 1st CMC bilaterally.  Tender across pips and mcps without focal swelling   No swelling, tenderness, redness or restriction of motion of the DIPs, wrists, elbows, ankles, or joints of the feet.  Bilateral shoulders with full ROM  SI joints-tender. No tenderness over the greater trochanters.  Spinous process tenderness diffusely   Bilateral knees without medial joint line tenderness, no crepitus, no effusion.  No achilles tendon tenderness   Lymphadenopathy:      Cervical: No cervical adenopathy.   Skin:     General: Skin is warm and dry.      Findings: No erythema.      Comments: No malar rash  No periungal erythema  Hyperpigmentation upper back/neck area  Acne changes over back diffusely  No fingernail pitting/onycholysis    Neurological:      Mental Status: He is alert and oriented to person, place, and time.      Cranial Nerves: No cranial nerve deficit.      Gait: Gait abnormal (difficult standing from seated position).   Psychiatric:         Mood and Affect: Mood normal.         Behavior: Behavior normal.       ?  Radiology review:      Narrative   PROCEDURE:  XR SACROILIAC JOINTS (MIN 3 VIEWS) (CPT=72202)     INDICATIONS:  M54.89 Inflammatory back pain M53.3 Sacroiliac joint dysfunction     COMPARISON:  None.     TECHNIQUE:  Frontal and oblique of the sacroiliac joints were obtained.     PATIENT STATED HISTORY: (As transcribed by Technologist)  Lower back pain around SI joint. No injuryt . Eval for RA.         FINDINGS:   Sacrum and sacroiliac joints are intact.  Sacral foramina are intact.  Visualized bony pelvis is unremarkable.                   Impression   CONCLUSION:  There is no evidence of inflammatory sacroiliitis.  If indicated MRI is more sensitive.        Dictated by (CST): Jovany Rios MD on 8/10/2022 at 4:39 PM      Finalized by (CST): Jovany Rios MD on 8/10/2022 at 4:40 PM       Narrative   PROCEDURE:  XR CHEST PA + LAT CHEST (CPT=71046)     LOCATION:  [facility]     INDICATIONS:  Covid+ 10/12 (per [facility])- Chest Cough     COMPARISON:  None.     TECHNIQUE:  PA and lateral chest radiographs were obtained.     PATIENT STATED HISTORY: (As transcribed by Technologist)  Patient states he started to have some chest congestion and shortness of breath 3 days ago. Covid positive 10.12         FINDINGS:    LUNGS:  No focal consolidation.  Normal vascularity.  CARDIAC:  Normal size cardiac silhouette.  MEDIASTINUM:  Normal.  PLEURA:  Normal.  No pleural effusions.  BONES:  Normal for age.          Impression   CONCLUSION:  Negative exam.           Dictated by (CST): Mike Stevens DO on 10/12/2022 at 8:10 PM      Finalized by (CST): Mike Stevens DO on 10/12/2022 at 8:10 PM        Narrative   PROCEDURE:  XR ANKLE (MIN 3 VIEWS), RIGHT (CPT=73610)     TECHNIQUE:  Three views were obtained.     COMPARISON:  None.     INDICATIONS:  ankle pain     PATIENT STATED HISTORY:  Patient states he got his right ankle caught in the springs of a trampoline yesterday.  Lateral right ankle pain.         FINDINGS:     Mild ankle soft tissue swelling especially laterally. No acute fracture or dislocation. Symmetric ankle mortise. The joint space is not profiled on the lateral secondary to mild oblique positioning.      Impression   CONCLUSION:       No acute right ankle fracture or dislocation.           Dictated by: Lon Brown MD on 8/11/2016 at 15:34      Approved by: Lon Brown MD         Narrative   PROCEDURE:  RADIOGRAPH OF  THE RIGHT HAND (MINIMUM 3 VIEWS)     TECHNIQUE:  Three views were obtained.     COMPARISON:  None.     INDICATIONS:  959.4 Injury, other and unspecified, hand, except finger     FINDINGS:  No fracture or subluxation.     CONCLUSION:  No acute visible fracture.  See above description.          Dictated by:  Satish Rodrigues MD on 6/17/2014 at 19:57           Labs:  Lab Results   Component Value Date    WBC 6.3 08/15/2024    RBC 5.46 08/15/2024    HGB 16.6 08/15/2024    HCT 48.8 08/15/2024    .0 08/15/2024    MCV 89.6 01/08/2025    MCH 30.4 08/15/2024    MCHC 33.8 01/08/2025    RDW 12.2 08/15/2024    NEPRELIM 3.17 08/15/2024    NEPERCENT 67.7 01/08/2025    LYPERCENT 24.5 01/08/2025    MOPERCENT 7.1 08/15/2024    EOPERCENT 1.6 08/15/2024    BAPERCENT 1.1 08/15/2024    NE 3.17 08/15/2024    LYMABS 2.50 08/15/2024    MOABSO 0.45 08/15/2024    EOABSO 0.10 08/15/2024    BAABSO 0.07 08/15/2024     Lab Results   Component Value Date    GLU 92 08/15/2024    BUN 11 08/15/2024    CREATSERUM 1.07 08/15/2024    ANIONGAP 8 08/15/2024    GFR 74 05/13/2016    GFRNAA 106 07/13/2022    GFRAA 122 07/13/2022    CA 10.6 (H) 08/15/2024    OSMOCALC 287 08/15/2024    ALKPHO 68 08/15/2024    AST 43 (H) 08/15/2024    ALT 44 08/15/2024    BILT 0.5 08/15/2024    TP 8.2 08/15/2024    ALB 5.1 (H) 08/15/2024    GLOBULIN 3.1 08/15/2024     08/15/2024    K 4.2 08/15/2024     08/15/2024    CO2 26.0 08/15/2024       Additional Labs:    07/2022  HIV non reactive  VICKIE by IFA negative  ANCA negative  MPO negative  PR3 negative  RF negative  CCP negative   Uric acid 6.6  TB negative   Lyme screen negative    2021  VICKIE screen negative  RF negative  CCP negative  HLAB27 negative  Treponema non reactive     05/2016  ESR normal  CRP normal  IgA normal     Diamond Molina DO  [facility]  1/17/2025           [1]   Outpatient Medications Marked as Taking for the 1/17/25 encounter (Office Visit) with Diamond Molina DO   Medication Sig  Dispense Refill    ondansetron 4 MG Oral Tablet Dispersible Take 1 tablet (4 mg total) by mouth every 8 (eight) hours as needed for Nausea.      albuterol 108 (90 Base) MCG/ACT Inhalation Aero Soln Inhale 2 puffs into the lungs every 4 (four) hours as needed.     [2]   Allergies  Allergen Reactions    Amoxicillin RASH

## 2025-02-01 ENCOUNTER — HOSPITAL ENCOUNTER (OUTPATIENT)
Dept: GENERAL RADIOLOGY | Age: 20
Discharge: HOME | End: 2025-02-01
Attending: INTERNAL MEDICINE
Payer: COMMERCIAL

## 2025-02-01 DIAGNOSIS — M54.89 INFLAMMATORY BACK PAIN: ICD-10-CM

## 2025-02-01 DIAGNOSIS — I73.00 RAYNAUD'S DISEASE WITHOUT GANGRENE: ICD-10-CM

## 2025-02-01 DIAGNOSIS — M25.50 POLYARTHRALGIA: ICD-10-CM

## 2025-02-01 DIAGNOSIS — Z86.69 HISTORY OF EPISCLERITIS: ICD-10-CM

## 2025-02-01 PROCEDURE — 72072 X-RAY EXAM THORAC SPINE 3VWS: CPT | Performed by: INTERNAL MEDICINE

## 2025-02-01 PROCEDURE — 72040 X-RAY EXAM NECK SPINE 2-3 VW: CPT | Performed by: INTERNAL MEDICINE

## 2025-02-01 PROCEDURE — 72202 X-RAY EXAM SI JOINTS 3/> VWS: CPT | Performed by: INTERNAL MEDICINE

## 2025-02-07 PROBLEM — H15.101 EPISCLERITIS OF RIGHT EYE: Status: ACTIVE | Noted: 2021-12-20

## 2025-02-25 PROBLEM — R12 HEARTBURN: Status: ACTIVE | Noted: 2025-02-25

## 2025-03-11 ENCOUNTER — LAB ENCOUNTER (OUTPATIENT)
Dept: LAB | Age: 20
End: 2025-03-11
Attending: NURSE PRACTITIONER
Payer: COMMERCIAL

## 2025-03-11 DIAGNOSIS — E66.813 CLASS 3 SEVERE OBESITY WITHOUT SERIOUS COMORBIDITY WITH BODY MASS INDEX (BMI) OF 40.0 TO 44.9 IN ADULT, UNSPECIFIED OBESITY TYPE (HCC): ICD-10-CM

## 2025-03-11 DIAGNOSIS — R63.5 ABNORMAL WEIGHT GAIN: ICD-10-CM

## 2025-03-11 DIAGNOSIS — L83 ACANTHOSIS NIGRICANS: ICD-10-CM

## 2025-03-11 DIAGNOSIS — Z86.69 HISTORY OF EPISCLERITIS: ICD-10-CM

## 2025-03-11 DIAGNOSIS — M54.89 INFLAMMATORY BACK PAIN: ICD-10-CM

## 2025-03-11 DIAGNOSIS — E78.00 PURE HYPERCHOLESTEROLEMIA: ICD-10-CM

## 2025-03-11 DIAGNOSIS — M25.50 POLYARTHRALGIA: ICD-10-CM

## 2025-03-11 DIAGNOSIS — R10.13 EPIGASTRIC PAIN: ICD-10-CM

## 2025-03-11 DIAGNOSIS — R14.0 ABDOMINAL BLOATING: ICD-10-CM

## 2025-03-11 DIAGNOSIS — I73.00 RAYNAUD'S DISEASE WITHOUT GANGRENE: ICD-10-CM

## 2025-03-11 DIAGNOSIS — E66.01 CLASS 3 SEVERE OBESITY WITHOUT SERIOUS COMORBIDITY WITH BODY MASS INDEX (BMI) OF 40.0 TO 44.9 IN ADULT, UNSPECIFIED OBESITY TYPE (HCC): ICD-10-CM

## 2025-03-11 DIAGNOSIS — R79.89 ELEVATED LFTS: ICD-10-CM

## 2025-03-11 LAB
ALBUMIN SERPL-MCNC: 4.8 G/DL (ref 3.2–4.8)
ALBUMIN/GLOB SERPL: 1.8 {RATIO} (ref 1–2)
ALP LIVER SERPL-CCNC: 51 U/L
ALT SERPL-CCNC: 22 U/L
ANION GAP SERPL CALC-SCNC: 9 MMOL/L (ref 0–18)
AST SERPL-CCNC: 20 U/L (ref ?–34)
BASOPHILS # BLD AUTO: 0.03 X10(3) UL (ref 0–0.2)
BASOPHILS NFR BLD AUTO: 0.5 %
BILIRUB SERPL-MCNC: 0.4 MG/DL (ref 0.3–1.2)
BUN BLD-MCNC: 11 MG/DL (ref 9–23)
CALCIUM BLD-MCNC: 9.2 MG/DL (ref 8.7–10.6)
CHLORIDE SERPL-SCNC: 105 MMOL/L (ref 98–112)
CHOLEST SERPL-MCNC: 234 MG/DL (ref ?–200)
CO2 SERPL-SCNC: 27 MMOL/L (ref 21–32)
CREAT BLD-MCNC: 1.08 MG/DL
CRP SERPL-MCNC: <0.4 MG/DL (ref ?–0.5)
EGFRCR SERPLBLD CKD-EPI 2021: 100 ML/MIN/1.73M2 (ref 60–?)
EOSINOPHIL # BLD AUTO: 0.09 X10(3) UL (ref 0–0.7)
EOSINOPHIL NFR BLD AUTO: 1.5 %
ERYTHROCYTE [DISTWIDTH] IN BLOOD BY AUTOMATED COUNT: 12.4 %
EST. AVERAGE GLUCOSE BLD GHB EST-MCNC: 105 MG/DL (ref 68–126)
FASTING PATIENT LIPID ANSWER: YES
FASTING STATUS PATIENT QL REPORTED: YES
GLOBULIN PLAS-MCNC: 2.7 G/DL (ref 2–3.5)
GLUCOSE BLD-MCNC: 91 MG/DL (ref 70–99)
HBA1C MFR BLD: 5.3 % (ref ?–5.7)
HCT VFR BLD AUTO: 45.7 %
HDLC SERPL-MCNC: 41 MG/DL (ref 40–59)
HGB BLD-MCNC: 15.5 G/DL
IMM GRANULOCYTES # BLD AUTO: 0.02 X10(3) UL (ref 0–1)
IMM GRANULOCYTES NFR BLD: 0.3 %
LDLC SERPL CALC-MCNC: 167 MG/DL (ref ?–100)
LYMPHOCYTES # BLD AUTO: 2.29 X10(3) UL (ref 1–4)
LYMPHOCYTES NFR BLD AUTO: 38.4 %
MCH RBC QN AUTO: 30.5 PG (ref 26–34)
MCHC RBC AUTO-ENTMCNC: 33.9 G/DL (ref 31–37)
MCV RBC AUTO: 89.8 FL
MONOCYTES # BLD AUTO: 0.42 X10(3) UL (ref 0.1–1)
MONOCYTES NFR BLD AUTO: 7 %
NEUTROPHILS # BLD AUTO: 3.11 X10 (3) UL (ref 1.5–7.7)
NEUTROPHILS # BLD AUTO: 3.11 X10(3) UL (ref 1.5–7.7)
NEUTROPHILS NFR BLD AUTO: 52.3 %
NONHDLC SERPL-MCNC: 193 MG/DL (ref ?–130)
OSMOLALITY SERPL CALC.SUM OF ELEC: 291 MOSM/KG (ref 275–295)
PLATELET # BLD AUTO: 338 10(3)UL (ref 150–450)
POTASSIUM SERPL-SCNC: 4 MMOL/L (ref 3.5–5.1)
PROT SERPL-MCNC: 7.5 G/DL (ref 5.7–8.2)
RBC # BLD AUTO: 5.09 X10(6)UL
RHEUMATOID FACT SERPL-ACNC: 5.4 IU/ML (ref ?–14)
SODIUM SERPL-SCNC: 141 MMOL/L (ref 136–145)
TRIGL SERPL-MCNC: 144 MG/DL (ref 30–149)
VLDLC SERPL CALC-MCNC: 29 MG/DL (ref 0–30)
WBC # BLD AUTO: 6 X10(3) UL (ref 4–11)

## 2025-03-11 PROCEDURE — 80061 LIPID PANEL: CPT | Performed by: FAMILY MEDICINE

## 2025-03-11 PROCEDURE — 86038 ANTINUCLEAR ANTIBODIES: CPT | Performed by: INTERNAL MEDICINE

## 2025-03-11 PROCEDURE — 86431 RHEUMATOID FACTOR QUANT: CPT | Performed by: INTERNAL MEDICINE

## 2025-03-11 PROCEDURE — 86200 CCP ANTIBODY: CPT | Performed by: INTERNAL MEDICINE

## 2025-03-11 PROCEDURE — 85652 RBC SED RATE AUTOMATED: CPT | Performed by: INTERNAL MEDICINE

## 2025-03-11 PROCEDURE — 83036 HEMOGLOBIN GLYCOSYLATED A1C: CPT | Performed by: INTERNAL MEDICINE

## 2025-03-11 PROCEDURE — 86140 C-REACTIVE PROTEIN: CPT | Performed by: INTERNAL MEDICINE

## 2025-03-12 LAB — ERYTHROCYTE [SEDIMENTATION RATE] IN BLOOD: 22 MM/HR

## 2025-03-12 NOTE — PROGRESS NOTES
Liver enzymes now normal.  Labs look great.  Repeat in 6 months.    Please call with any questions,    Ron Finn MD

## 2025-03-13 LAB
CCP IGG SERPL-ACNC: 1.9 U/ML (ref 0–6.9)
NUCLEAR IGG TITR SER IF: NEGATIVE {TITER}

## 2025-03-27 ENCOUNTER — HOSPITAL ENCOUNTER (OUTPATIENT)
Dept: ULTRASOUND IMAGING | Age: 20
Discharge: HOME | End: 2025-03-27
Attending: NURSE PRACTITIONER
Payer: COMMERCIAL

## 2025-03-27 DIAGNOSIS — R10.13 EPIGASTRIC PAIN: ICD-10-CM

## 2025-03-27 DIAGNOSIS — R14.0 ABDOMINAL BLOATING: ICD-10-CM

## 2025-03-27 DIAGNOSIS — R11.0 NAUSEA: ICD-10-CM

## 2025-03-27 DIAGNOSIS — R68.81 EARLY SATIETY: ICD-10-CM

## 2025-03-27 PROCEDURE — 76700 US EXAM ABDOM COMPLETE: CPT | Performed by: NURSE PRACTITIONER

## 2025-04-28 ENCOUNTER — OFFICE VISIT (OUTPATIENT)
Dept: RHEUMATOLOGY | Facility: CLINIC | Age: 20
End: 2025-04-28
Payer: COMMERCIAL

## 2025-04-28 VITALS
TEMPERATURE: 98 F | RESPIRATION RATE: 16 BRPM | WEIGHT: 280 LBS | BODY MASS INDEX: 40.09 KG/M2 | OXYGEN SATURATION: 97 % | SYSTOLIC BLOOD PRESSURE: 122 MMHG | HEIGHT: 70 IN | HEART RATE: 92 BPM | DIASTOLIC BLOOD PRESSURE: 80 MMHG

## 2025-04-28 DIAGNOSIS — Z82.61 FAMILY HISTORY OF RHEUMATOID ARTHRITIS: ICD-10-CM

## 2025-04-28 DIAGNOSIS — M51.360 DEGENERATION OF INTERVERTEBRAL DISC OF LUMBAR REGION WITH DISCOGENIC BACK PAIN: ICD-10-CM

## 2025-04-28 DIAGNOSIS — M25.50 POLYARTHRALGIA: Primary | ICD-10-CM

## 2025-04-28 DIAGNOSIS — Z86.69 HISTORY OF EPISCLERITIS: ICD-10-CM

## 2025-04-28 DIAGNOSIS — I73.00 RAYNAUD'S DISEASE WITHOUT GANGRENE: ICD-10-CM

## 2025-04-28 DIAGNOSIS — R79.82 ELEVATED C-REACTIVE PROTEIN (CRP): ICD-10-CM

## 2025-04-28 DIAGNOSIS — R70.0 ELEVATED SED RATE: ICD-10-CM

## 2025-04-28 DIAGNOSIS — M54.89 INFLAMMATORY BACK PAIN: ICD-10-CM

## 2025-04-28 PROCEDURE — 3079F DIAST BP 80-89 MM HG: CPT | Performed by: INTERNAL MEDICINE

## 2025-04-28 PROCEDURE — 99213 OFFICE O/P EST LOW 20 MIN: CPT | Performed by: INTERNAL MEDICINE

## 2025-04-28 PROCEDURE — 3008F BODY MASS INDEX DOCD: CPT | Performed by: INTERNAL MEDICINE

## 2025-04-28 PROCEDURE — 3074F SYST BP LT 130 MM HG: CPT | Performed by: INTERNAL MEDICINE

## 2025-04-28 NOTE — PROGRESS NOTES
?  RHEUMATOLOGY FOLLOW UP   Date of visit: 04/28/2025  ?  Chief Complaint   Patient presents with    Follow - Up     3 month f/u. Feeling well. Working on staying active and eating better. Has lost weight since last visit. No overall joint pain or back pain. Converted rapid score of 1.7       ?  ASSESSMENT, DISCUSSION & PLAN   Assessment:  1. Polyarthralgia    2. Raynaud's disease without gangrene    3. Degeneration of intervertebral disc of lumbar region with discogenic back pain    4. Inflammatory back pain    5. History of episcleritis    6. Family history of rheumatoid arthritis    7. Elevated sed rate    8. Elevated C-reactive protein (CRP)          Discussion:  Mr. Satish Wolf is a 18 yo gentleman who presents for evaluation due to worsened joint and back pain. He had been working until recently when he injured his back, was diagnosed with a bulging disc, completed PT but continues to have significant pain and has not been able to exercise/move due to concerns for worsening his pain. Unfortunately, he has gained significant weight during this time. He also has signs of acanthosis nigricans over his upper back/neck area. As part of his workup, will check an A1c and pt understands he will have to follow back with PCP if abnormal. Aside from the back, pt has been suffering from joint pain for several years. He also hs dry eyes, raynauds, possible livedo reticularis in addition to hand, feet and knee pain. He also has symptoms that seem to align with reactive arthritis with hx of episcleritis and urethritis (with out UTI) but prior extensive work up negative including VICKIE, RF/CCP, HLAB27.   Follow up testing was grossly negative with exception of borderline elevated inflammatory markers.   Xrays did not show signs of ankylosing spondylitis.  Overall, pt feels better since his last visit. Has not been taking NSAIDs regularly for the pain. He has been adjusting his diet and exercising regularly.  While he still gets some pain, he feels better overall.  Will have him get updated labs to monitor inflammatory markers, esepcially now that he has intentionally lost weight.   Recommended we hold off on medication intervention for now and monitor his pain with continued further weight loss and regular exercise.   Discussed that if he has another occurrence of the possible reactive arthritis, to reach out right away and further intervention/workup can be done.   He will follow up in 6 months or sooner as needed.   Recommended continued follow up with GI given the chest discomfort and dx of esophagitis/gastritis on EGD. .    Patient verbalized understanding of above instructions. No further questions at this time.    Code selection for this visit was based on time spent (25min) on date of service in preparing to see the patient, obtaining and/or reviewing separately obtained history, performing a medically appropriate examination, counseling and educating the patient/family/caregiver, ordering medications or testing, referring and communicating with other healthcare providers, documenting clinical information in the E HR, independently interpreting results and communicating results to the patient/family/caregiver and care coordination with the patient's other providers.    ?  Plan:  Diagnoses and all orders for this visit:    Polyarthralgia  -     C-Reactive Protein; Future  -     Sed Rate, Westergren (Automated); Future    Raynaud's disease without gangrene    Degeneration of intervertebral disc of lumbar region with discogenic back pain    Inflammatory back pain    History of episcleritis    Family history of rheumatoid arthritis    Elevated sed rate  -     C-Reactive Protein; Future  -     Sed Rate, Westergren (Automated); Future    Elevated C-reactive protein (CRP)  -     C-Reactive Protein; Future  -     Sed Rate, Westergren (Automated); Future          Return in about 6 months (around 10/28/2025).  ?  HPI    Satish Wolf is a 19 year old male with the following active problems who is referred for rheumatologic evaluation due to joint and back pain. He presents for follow up today.     Since his last visit, he has been feeling better overall  Does have knee pain bilaterally, left more significant  Some back pain but feels better  Still with cracking of the elbows and the knees  Is only taking ibuprofen/tylenol about per week  Is focusing on diet and exercise. Is trying to make healthier choices.   Is walking daily- about 3-4 miles.   And stretching somewhat.      States a few weeks after his last visit, he had some UTI like symptoms and eye inflammation for a few days and resolved on it's own.   Denies skin rashes  + morning stiffness primarily in the back and knees, lasting only 10-20 minutes   Seen by GI, had EGD in Feb- dx with reflux esophagitis and antral gastritis -- symptoms improved with omeprazole. Does note some chest pain/discomfort when waking up in the morning   Denies diarrhea/constipation or blood in stools   + some jaw pain about a month ago and hasn't returned  + some small bmps on the lower fingers and ring fingers. Felt under the skin but never blisters/open.   Prior nodules on shins not recurred   Seen by dermatology last year  Still when cold, fingers and face seem to change colors. Can get some locking sensation of the fingers when cold and improves when putting under warm water.       HPI from initial consultation  referred for rheumatologic evaluation due to joint and back pain.    States early high school, was dealing with lower back tightness and stiffness. Seen by chiropractor without relief. Was taking advil/tylenol daily due to pain.  Hx of disc herniation about a year ago while at work- was taking meloxicam and muscle relaxant. Stopped recently to flush system. Also completed PT for 3 months (two months in a row- daily x 5 hours)  Tried dry needling without much relief.      End of high school- right eye vision went black. Seen by ophthalmology and exam was normal. Then a few months later, had redness and swelling- recommended rheum and took steroids.   Then following episode, similar and told could be related to sleep.  Also has urinary symptoms like a UTI but testing would be negative.     Hx of episcleritis and urethritis  Hx of renal stones- no surgery required. Hx of hematuria as well. Denies tea colored urine or frothy urine.   Never placed on immunosuppressive medications    Continues with low back pain.  Feels hands/feet get cold easily and locking with pain.  Neck tightness  5x/year some left knee pain   Some color change of the hands- getting more pale and fingertips turning purple. Denies fingertip ulcers  Gets painful bumps on the hands and resolve on their own after a few days. Never drain.   Pain in back can wake from sleep at night- less now compared to last year, but still occurring.     + weight gain due to inactivity from the severity of the back pain. About 50# over the past year.     Denies skin rashes. Seen by dermatology for grey patches around neck/buttock- told acanthosis nigricans     + morning stiffness, previously 1-2 hours, now about 45 minutes, primarily the back and somewhat neck.   + frequent epistaxis   + hx of seizure at 1 years old- did not continue medication long term. And no recent issues.   + ?hx of lacy reticular pattern of the skin on the lower extremities- only one episode and no pain or long standing changes   + significant reflux- dx with hiatal hernia and thickening of distal esophagus- has not seen GI yet. Taking omeprazole and pepcid  + 3 episodes of sweating and hot and tinnitus along with dizziness-  not always on exertion. No workup by cards/neurology   + bumps/nodules over the shin. Not tender.   + sharp jaw pain- told by dentist not TMJ.     + dry/itchy eyes, using OTC drops. No hx of plugs.     Hx of asthma - albuterol as needed      No wrist pain or swelling, pain or swelling of the MCPs, pain or swelling of the ankles, or new skin nodule formation.  The patient denies hair loss, oral or nasal ulcers, photosensitive rash, elevated or scarring rashes, prior hematologic abnormality, prior liver disease.  No history of prior blood clot in the legs or lungs, strokes or ischemic phenomenon.  Denies nonhealing ulcers on the fingertips, skin calcifications, trouble swallowing.  The patient denies any history crampy abdominal pain, bloating, constipation, diarrhea, bloody stools, Achilles heel pain or symptoms of enthesitis, psoriatic lesions, spooning or pitting of the nails, history of dactylitis.   There are no symptoms of severe dry mouth, recurrent cavities, or swelling of the cheeks or under the jawbone.   No fevers, lymphadenopathy, night sweats, unexpected weight loss, easy bruising or bleeding, or unexplained weakness.  Denies chronic sinus infections/disease.  Denies chronic cough or hemoptysis.     Family hx  Mother and maternal grandmother with hx of renal stones and GM with CKD   Father with seronegative RA on Enbrel       Past Medical History:  Past Medical History:    Abdominal distention    Abdominal hernia    Arthritis    Asthma (HCC)    Back pain    Bloating    Calculus of kidney    Constipation    Flatulence/gas pain/belching    Food intolerance    Heartburn    Kidney stone    Problems with swallowing    Seizures (HCC)    Stress     Past Surgical History:  Past Surgical History:   Procedure Laterality Date    Appendectomy  11/5/15    [facility]    Appendectomy      Tonsillectomy       Family History:  Family History   Problem Relation Age of Onset    Other (rheumatoid arthritis) Father     Heart Attack Paternal Grandmother     Stroke Paternal Grandmother     Prostate Cancer Paternal Grandfather     Heart Disease Paternal Grandfather     Diabetes Paternal Uncle     Cancer Neg      Social History:  Social History      Socioeconomic History    Marital status: Single   Tobacco Use    Smoking status: Never    Smokeless tobacco: Never   Vaping Use    Vaping status: Never Used   Substance and Sexual Activity    Alcohol use: Not Currently     Comment: weekends    Drug use: Never     Social Drivers of Health      Received from [facility]    Housing Stability     Medications:  Medications Taking[1]  Modified Medications    No medications on file     There are no discontinued medications.  ?  ?  Allergies:  Allergies[2]  ?  REVIEW OF SYSTEMS   ?  Review of Systems   Constitutional:  Negative for chills, fever and malaise/fatigue.   HENT:  Positive for tinnitus (stable). Negative for nosebleeds.    Eyes:  Negative for pain and redness.        Can happen during flares   Respiratory:  Negative for cough and shortness of breath.    Cardiovascular:  Positive for chest pain (muscular, typically when waking up). Negative for palpitations and leg swelling.   Gastrointestinal:  Positive for heartburn. Negative for abdominal pain, blood in stool, constipation, diarrhea and nausea.   Genitourinary:  Positive for hematuria. Negative for dysuria, frequency and urgency.        Can occur during flare   Musculoskeletal:  Positive for back pain, joint pain and neck pain. Negative for myalgias.   Skin:  Positive for itching and rash.   Neurological:  Positive for tingling (in the hands when cold/color changes). Negative for dizziness, seizures (childhood hx), weakness and headaches.   Endo/Heme/Allergies:  Positive for environmental allergies. Does not bruise/bleed easily.     PHYSICAL EXAM   Today's Vitals:  Temperature Blood Pressure Heart Rate Resp Rate SpO2   Temp: 97.9 °F (36.6 °C) BP: 122/80 Pulse: 92 Resp: 16 SpO2: 97 %   ?  Current Weight Height BMI BSA Pain   Wt Readings from Last 1 Encounters:   04/28/25 280 lb (127 kg)    Height: 5' 10\" (177.8 cm) Body mass index is 40.18 kg/m². Body surface area is 2.41 meters squared.          Physical Exam  Vitals and nursing note reviewed.   Constitutional:       General: He is not in acute distress.     Appearance: He is well-developed. He is obese. He is not diaphoretic.   HENT:      Head: Normocephalic.   Eyes:      General: No scleral icterus.     Extraocular Movements: Extraocular movements intact.      Conjunctiva/sclera: Conjunctivae normal.   Neck:      Vascular: No JVD.      Trachea: No tracheal deviation.   Cardiovascular:      Rate and Rhythm: Regular rhythm. Tachycardia present.      Heart sounds: Normal heart sounds. No murmur heard.  Pulmonary:      Effort: Pulmonary effort is normal. No respiratory distress.      Breath sounds: Normal breath sounds. No wheezing.   Musculoskeletal:         General: Tenderness present. No swelling.      Cervical back: Neck supple.      Comments: No evidence of heberden or johanny nodes of any of the fingers, no basilar joint tenderness of the 1st CMC bilaterally.  Tender across pips and mcps without focal swelling - now more over left index/middle and not much on right hand   No swelling, tenderness, redness or restriction of motion of the DIPs, wrists, elbows, ankles, or joints of the feet.  Bilateral shoulders with full ROM  Bilateral knees without medial joint line tenderness, no crepitus, no effusion.  No achilles tendon tenderness   Lymphadenopathy:      Cervical: No cervical adenopathy.   Skin:     General: Skin is warm and dry.      Findings: No erythema.      Comments: No malar rash  No periungal erythema  Hyperpigmentation upper back/neck area- stable  No fingernail pitting/onycholysis    Neurological:      Mental Status: He is alert and oriented to person, place, and time.      Cranial Nerves: No cranial nerve deficit.      Gait: Gait normal.   Psychiatric:         Mood and Affect: Mood normal.         Behavior: Behavior normal.       ?  Radiology review:      Narrative                 Impression   PROCEDURE:  XR CERVICAL SPINE (2 VIEWS)  (CPT=72040)     COMPARISON:  None.     INDICATIONS:  M25.50 Polyarthralgia I73.00 Raynaud's disease without gangrene M54.89 Inflammatory back pain Z86.69 History of episcleritis     PATIENT STATED HISTORY: (As transcribed by Technologist)  Patient with back pain, polyarthragia. History of episcleritis and Raynauds disease. Family history of rheumatoid arthritis.         FINDINGS:  C1-C7 are adequately visualized.  No evidence of fracture or dislocation.  Alignment is normal.  Prevertebral soft tissues are within normal limits. Predental space is normal.  The vertebral body heights are maintained throughout the cervical spine.  Disc spaces are maintained throughout the cervical spine.  The open-mouth views are unremarkable.     IMPRESSION:  Unremarkable radiographs of the cervical spine.        LOCATION:  [facility]        Dictated by (CST): Jose Knight MD on 2/01/2025 at 2:58 PM      Finalized by (CST): Jose Knight MD on 2/01/2025 at 2:58 PM       Narrative                 Impression   PROCEDURE:  XR THORACIC SPINE (3 VIEWS) (CPT=72072)     TECHNIQUE:  AP, lateral, and swimmer's views of the thoracic spine were obtained.     COMPARISON:  None.     INDICATIONS:  M25.50 Polyarthralgia I73.00 Raynaud's disease without gangrene M54.89 Inflammatory back pain Z86.69 History of episcleritis     PATIENT STATED HISTORY: (As transcribed by Technologist)  Patient with back pain, polyarthragia. History of episcleritis and Raynauds disease. Family history of rheumatoid arthritis.         Findings:     Alignment is normal.     Vertebral body heights are maintained throughout the thoracic spine.     Disc spaces are maintained throughout the thoracic spine.     The upper thoracic vertebral bodies are not well visualized on lateral view.     IMPRESSION:     Unremarkable radiographs of the thoracic spine.        LOCATION:  [facility]           Dictated by (CST): Jose Knight MD on 2/01/2025 at 2:58 PM      Finalized by  (CST): Jose Knight MD on 2/01/2025 at 2:59 PM       Narrative   PROCEDURE:  XR SACROILIAC JOINTS (MIN 3 VIEWS) (CPT=72202)     INDICATIONS:  M25.50 Polyarthralgia I73.00 Raynaud's disease without gangrene M54.89 Inflammatory back pain Z86.69 History of episcleritis     COMPARISON:  [facility], XR, XR SACROILIAC JOINTS (MIN 3 VIEWS) (CPT=72202), 8/10/2022, 4:09 PM.     TECHNIQUE:  Frontal and oblique of the sacroiliac joints were obtained.     PATIENT STATED HISTORY: (As transcribed by Technologist)  Patient with back pain, polyarthragia. History of episcleritis and Raynauds disease. Family history of rheumatoid arthritis.         FINDINGS:       Sacroiliac joints are symmetric.     No fracture or dislocation                   Impression   CONCLUSION:  Unremarkable radiographs of the sacroiliac joints.        LOCATION:  [facility]                  Dictated by (CST): Jose Knight MD on 2/01/2025 at 2:59 PM      Finalized by (CST): Jose Knight MD on 2/01/2025 at 2:59 PM       Narrative   PROCEDURE:  XR SACROILIAC JOINTS (MIN 3 VIEWS) (CPT=72202)     INDICATIONS:  M54.89 Inflammatory back pain M53.3 Sacroiliac joint dysfunction     COMPARISON:  None.     TECHNIQUE:  Frontal and oblique of the sacroiliac joints were obtained.     PATIENT STATED HISTORY: (As transcribed by Technologist)  Lower back pain around SI joint. No injuryt . Eval for RA.         FINDINGS:  Sacrum and sacroiliac joints are intact.  Sacral foramina are intact.  Visualized bony pelvis is unremarkable.                   Impression   CONCLUSION:  There is no evidence of inflammatory sacroiliitis.  If indicated MRI is more sensitive.        Dictated by (CST): Jovany Rios MD on 8/10/2022 at 4:39 PM      Finalized by (CST): Jovany Rios MD on 8/10/2022 at 4:40 PM       Narrative   PROCEDURE:  XR CHEST PA + LAT CHEST (CPT=71046)     LOCATION:  [facility]     INDICATIONS:  Covid+ 10/12 (per [facility])- Chest Cough      COMPARISON:  None.     TECHNIQUE:  PA and lateral chest radiographs were obtained.     PATIENT STATED HISTORY: (As transcribed by Technologist)  Patient states he started to have some chest congestion and shortness of breath 3 days ago. Covid positive 10.12         FINDINGS:    LUNGS:  No focal consolidation.  Normal vascularity.  CARDIAC:  Normal size cardiac silhouette.  MEDIASTINUM:  Normal.  PLEURA:  Normal.  No pleural effusions.  BONES:  Normal for age.          Impression   CONCLUSION:  Negative exam.           Dictated by (CST): Mike Stevens DO on 10/12/2022 at 8:10 PM      Finalized by (CST): Mike Stevens DO on 10/12/2022 at 8:10 PM        Narrative   PROCEDURE:  XR ANKLE (MIN 3 VIEWS), RIGHT (CPT=73610)     TECHNIQUE:  Three views were obtained.     COMPARISON:  None.     INDICATIONS:  ankle pain     PATIENT STATED HISTORY:  Patient states he got his right ankle caught in the springs of a trampoline yesterday.  Lateral right ankle pain.         FINDINGS:     Mild ankle soft tissue swelling especially laterally. No acute fracture or dislocation. Symmetric ankle mortise. The joint space is not profiled on the lateral secondary to mild oblique positioning.      Impression   CONCLUSION:       No acute right ankle fracture or dislocation.           Dictated by: Lon Brown MD on 8/11/2016 at 15:34      Approved by: Lon Brown MD         Narrative   PROCEDURE:  RADIOGRAPH OF THE RIGHT HAND (MINIMUM 3 VIEWS)     TECHNIQUE:  Three views were obtained.     COMPARISON:  None.     INDICATIONS:  959.4 Injury, other and unspecified, hand, except finger     FINDINGS:  No fracture or subluxation.     CONCLUSION:  No acute visible fracture.  See above description.          Dictated by:  Satish Rodrigues MD on 6/17/2014 at 19:57           Labs:  Lab Results   Component Value Date    WBC 6.0 03/11/2025    RBC 5.09 03/11/2025    HGB 15.5 03/11/2025    HCT 45.7 03/11/2025    .0 03/11/2025    MCV 89.8  03/11/2025    MCH 30.5 03/11/2025    MCHC 33.9 03/11/2025    RDW 12.4 03/11/2025    NEPRELIM 3.11 03/11/2025    NEPERCENT 52.3 03/11/2025    LYPERCENT 38.4 03/11/2025    MOPERCENT 7.0 03/11/2025    EOPERCENT 1.5 03/11/2025    BAPERCENT 0.5 03/11/2025    NE 3.11 03/11/2025    LYMABS 2.29 03/11/2025    MOABSO 0.42 03/11/2025    EOABSO 0.09 03/11/2025    BAABSO 0.03 03/11/2025     Lab Results   Component Value Date    GLU 91 03/11/2025    BUN 11 03/11/2025    CREATSERUM 1.08 03/11/2025    ANIONGAP 9 03/11/2025    GFR 74 05/13/2016    GFRNAA 106 07/13/2022    GFRAA 122 07/13/2022    CA 9.2 03/11/2025    OSMOCALC 291 03/11/2025    ALKPHO 51 03/11/2025    AST 20 03/11/2025    ALT 22 03/11/2025    BILT 0.4 03/11/2025    TP 7.5 03/11/2025    ALB 4.8 03/11/2025    GLOBULIN 2.7 03/11/2025     03/11/2025    K 4.0 03/11/2025     03/11/2025    CO2 27.0 03/11/2025       Additional Labs:    03/2025  VICKIE screen negative  CCP negative  RF negative  ESR 22 borderline elevated  CRP normal  A1c 5.3    07/2022  HIV non reactive  VICKIE by IFA negative  ANCA negative  MPO negative  PR3 negative  RF negative  CCP negative   Uric acid 6.6  TB negative   Lyme screen negative    2021  VICKIE screen negative  RF negative  CCP negative  HLAB27 negative  Treponema non reactive     05/2016  ESR normal  CRP normal  IgA normal     Diamond Molina DO  [facility]  04/28/2025           [1]   Outpatient Medications Marked as Taking for the 4/28/25 encounter (Office Visit) with Diamond Molina DO   Medication Sig Dispense Refill    famotidine 40 MG Oral Tab Take 1 tablet (40 mg total) by mouth daily. In the morning. 30 tablet     Omeprazole 40 MG Oral Capsule Delayed Release Take 1 capsule (40 mg total) by mouth daily. 90 capsule 3    albuterol 108 (90 Base) MCG/ACT Inhalation Aero Soln Inhale 2 puffs into the lungs every 4 (four) hours as needed.     [2]   Allergies  Allergen Reactions    Amoxicillin RASH

## 2025-05-02 ENCOUNTER — HOSPITAL ENCOUNTER (OUTPATIENT)
Age: 20
Discharge: HOME | End: 2025-05-02
Payer: COMMERCIAL

## 2025-05-02 VITALS
SYSTOLIC BLOOD PRESSURE: 126 MMHG | HEIGHT: 70 IN | TEMPERATURE: 98 F | RESPIRATION RATE: 18 BRPM | DIASTOLIC BLOOD PRESSURE: 70 MMHG | BODY MASS INDEX: 40.09 KG/M2 | HEART RATE: 66 BPM | OXYGEN SATURATION: 98 % | WEIGHT: 280 LBS

## 2025-05-02 DIAGNOSIS — R30.0 DYSURIA: ICD-10-CM

## 2025-05-02 DIAGNOSIS — R39.9 URINARY TRACT INFECTION SYMPTOMS: Primary | ICD-10-CM

## 2025-05-02 LAB
BILIRUB UR QL STRIP: NEGATIVE
CLARITY UR: CLEAR
COLOR UR: YELLOW
GLUCOSE UR STRIP-MCNC: NEGATIVE MG/DL
HGB UR QL STRIP: NEGATIVE
KETONES UR STRIP-MCNC: NEGATIVE MG/DL
LEUKOCYTE ESTERASE UR QL STRIP: NEGATIVE
NITRITE UR QL STRIP: NEGATIVE
PH UR STRIP: 7.5 [PH]
PROT UR STRIP-MCNC: NEGATIVE MG/DL
SP GR UR STRIP: 1.02
UROBILINOGEN UR STRIP-ACNC: <2 MG/DL

## 2025-05-02 PROCEDURE — 99213 OFFICE O/P EST LOW 20 MIN: CPT | Performed by: PHYSICIAN ASSISTANT

## 2025-05-02 PROCEDURE — 81002 URINALYSIS NONAUTO W/O SCOPE: CPT | Performed by: PHYSICIAN ASSISTANT

## 2025-05-02 PROCEDURE — 87086 URINE CULTURE/COLONY COUNT: CPT | Performed by: PHYSICIAN ASSISTANT

## 2025-05-02 NOTE — DISCHARGE INSTRUCTIONS
As we discussed continue to push fluids  We will call you with your urine culture  Return to the ER symptoms worse

## 2025-05-02 NOTE — ED PROVIDER NOTES
Patient Seen in: [facility]      History     Chief Complaint   Patient presents with    Dysuria     Stated Complaint: urinary issues    Subjective:   The history is provided by the patient.       19-year-old male with past history of asthma, seizures, episcleritis/urethritis, and renal stones presents to immediate care due to dysuria starting days ago.  Describes a burning sensation postvoid lasting seconds to a minute.  Initially had dark-colored urine which has since improved with hydration.  Some urinary frequency.  Denies any fevers, abdominal pain, flank pain, gross hematuria.  No abnormal discharge.  Has not been sexually active in several months clinically no concern for STIs -has been worked up previously with similar complaints and negative.  Denies any penile pain, discharge testicle pain or swelling     Due to his autoimmune disorder he does at times have urethritis related to inflammation without infection.  Presents today for urine testing to verify no UTI.   History of Present Illness               Objective:     Past Medical History:    Abdominal distention    Abdominal hernia    Arthritis    Asthma (HCC)    Back pain    Bloating    Calculus of kidney    Constipation    Flatulence/gas pain/belching    Food intolerance    Heartburn    Kidney stone    Problems with swallowing    Seizures (HCC)    Stress              Past Surgical History:   Procedure Laterality Date    Appendectomy  11/5/15    [facility] Dr. Moore    Appendectomy      Tonsillectomy                  Social History     Socioeconomic History    Marital status: Single   Tobacco Use    Smoking status: Never    Smokeless tobacco: Never   Vaping Use    Vaping status: Never Used   Substance and Sexual Activity    Alcohol use: Not Currently     Comment: weekends    Drug use: Never     Social Drivers of Health      Received from [facility]    Housing Stability              Review of Systems   Constitutional: Negative.     Respiratory: Negative.     Cardiovascular: Negative.    Gastrointestinal: Negative.    Genitourinary:  Positive for dysuria and frequency. Negative for flank pain, hematuria, penile discharge, penile pain, penile swelling, scrotal swelling and testicular pain.       Positive for stated complaint: urinary issues  Other systems are as noted in HPI.  Constitutional and vital signs reviewed.      All other systems reviewed and negative except as noted above.                  Physical Exam     ED Triage Vitals [05/02/25 1258]   /70   Pulse 66   Resp 18   Temp 98.3 °F (36.8 °C)   Temp src Oral   SpO2 98 %   O2 Device None (Room air)       Current Vitals:   Vital Signs  BP: 126/70  Pulse: 66  Resp: 18  Temp: 98.3 °F (36.8 °C)  Temp src: Oral    Oxygen Therapy  SpO2: 98 %  O2 Device: None (Room air)        Physical Exam  Vitals and nursing note reviewed.   Constitutional:       General: He is not in acute distress.     Appearance: Normal appearance.   Eyes:      Conjunctiva/sclera: Conjunctivae normal.   Cardiovascular:      Rate and Rhythm: Normal rate and regular rhythm.   Pulmonary:      Effort: Pulmonary effort is normal.      Breath sounds: Normal breath sounds.   Abdominal:      General: Bowel sounds are normal. There is no distension.      Palpations: Abdomen is soft.      Tenderness: There is no abdominal tenderness. There is no right CVA tenderness, left CVA tenderness or guarding.   Musculoskeletal:         General: Normal range of motion.   Neurological:      General: No focal deficit present.      Mental Status: He is alert and oriented to person, place, and time.   Psychiatric:         Mood and Affect: Mood normal.         Behavior: Behavior normal.           Physical Exam                ED Course     Labs Reviewed   H POCT URINALYSIS DIPSTICK   URINE CULTURE, ROUTINE          Results                                 MDM   Ddx-UTI, pyelonephritis, nephrolithiasis, urosepsis.      On exam the patient is  afebrile nontoxic.  Vital signs are stable.  Clinically no concern for urosepsis.  Abdomen is soft nontender.   No CVA tenderness.  UA is unremarkable.  Urine culture pending. Clinically have no concern forpyelonephritis nor nephrolithiasis.  Patient declines any STI testing.  Discussed pros and cons of prophylactic treatment for prostatitis however due to his longstanding history of inflammatory urethritis will avoid antibiotics at this time and await the urine culture.  Patient will continue to follow-up with his rheumatologist discussed at length with the patient at home care strict return precautions and importance of close follow-up.  All questions were answered and the patient is comfortable with discharge home.      Medical Decision Making  Problems Addressed:  Dysuria: acute illness or injury  Urinary tract infection symptoms: acute illness or injury    Amount and/or Complexity of Data Reviewed  Labs: ordered. Decision-making details documented in ED Course.    Risk  OTC drugs.        Disposition and Plan     Clinical Impression:  1. Urinary tract infection symptoms    2. Dysuria         Disposition:  Discharge  5/2/2025  1:24 pm    Follow-up:  Anthony Vizcaino DO  [address]  [facility] [address]  [phone]                Medications Prescribed:  Discharge Medication List as of 5/2/2025  1:25 PM          Supplementary Documentation:

## 2025-05-12 ENCOUNTER — OFFICE VISIT (OUTPATIENT)
Dept: FAMILY MEDICINE CLINIC | Facility: CLINIC | Age: 20
End: 2025-05-12
Payer: COMMERCIAL

## 2025-05-12 VITALS
OXYGEN SATURATION: 97 % | WEIGHT: 283.25 LBS | BODY MASS INDEX: 41 KG/M2 | RESPIRATION RATE: 16 BRPM | TEMPERATURE: 97 F | DIASTOLIC BLOOD PRESSURE: 70 MMHG | HEART RATE: 87 BPM | SYSTOLIC BLOOD PRESSURE: 112 MMHG

## 2025-05-12 DIAGNOSIS — N34.2 URETHRITIS: Primary | ICD-10-CM

## 2025-05-12 DIAGNOSIS — L83 ACANTHOSIS NIGRICANS, ACQUIRED: ICD-10-CM

## 2025-05-12 DIAGNOSIS — R79.82 ELEVATED C-REACTIVE PROTEIN (CRP): ICD-10-CM

## 2025-05-12 DIAGNOSIS — R70.0 ELEVATED SED RATE: ICD-10-CM

## 2025-05-12 DIAGNOSIS — M25.50 POLYARTHRALGIA: ICD-10-CM

## 2025-05-12 LAB
CRP SERPL-MCNC: 0.5 MG/DL (ref ?–0.5)
ERYTHROCYTE [SEDIMENTATION RATE] IN BLOOD: 12 MM/HR (ref 0–15)

## 2025-05-12 PROCEDURE — 85652 RBC SED RATE AUTOMATED: CPT | Performed by: INTERNAL MEDICINE

## 2025-05-12 PROCEDURE — 3078F DIAST BP <80 MM HG: CPT | Performed by: FAMILY MEDICINE

## 2025-05-12 PROCEDURE — 3074F SYST BP LT 130 MM HG: CPT | Performed by: FAMILY MEDICINE

## 2025-05-12 PROCEDURE — 86140 C-REACTIVE PROTEIN: CPT | Performed by: INTERNAL MEDICINE

## 2025-05-12 PROCEDURE — 99214 OFFICE O/P EST MOD 30 MIN: CPT | Performed by: FAMILY MEDICINE

## 2025-05-12 RX ORDER — ECONAZOLE NITRATE 10 MG/G
CREAM TOPICAL
Qty: 85 G | Refills: 1 | Status: SHIPPED | OUTPATIENT
Start: 2025-05-12

## 2025-05-12 NOTE — PROGRESS NOTES
Satish Wolf is a 19 year old male.  HPI:   Ant started with episcleritis, and then 3 days later developed urethritis , had Neg STI testing , and took Pyridium. It really did not help. Took a week for the pain resolve. He is currently on Famotidine and  Omeprazole for Reflux.   Has been researching NF-1,  has some dark brown spots on upper back and in the gluteal fold  these have appeared recently, te area in gluteal fold seems to be larger and more extensive.  Current Outpatient Medications   Medication Sig Dispense Refill    Econazole Nitrate 1 % External Cream Apply to the affected area bid For 14 days, 85 g 1    famotidine 40 MG Oral Tab Take 1 tablet (40 mg total) by mouth in the morning. In the morning. 30 tablet     Omeprazole 40 MG Oral Capsule Delayed Release Take 1 capsule (40 mg total) by mouth daily. 90 capsule 3    albuterol 108 (90 Base) MCG/ACT Inhalation Aero Soln Inhale 2 puffs into the lungs every 4 (four) hours as needed. (Patient not taking: Reported on 5/12/2025)        Past Medical History:    Abdominal distention    Abdominal hernia    Arthritis    Asthma (HCC)    Back pain    Bloating    Calculus of kidney    Constipation    Flatulence/gas pain/belching    Food intolerance    Heartburn    Kidney stone    Problems with swallowing    Seizures (HCC)    Stress      Social History:  Social History     Socioeconomic History    Marital status: Single   Tobacco Use    Smoking status: Never    Smokeless tobacco: Never   Vaping Use    Vaping status: Never Used   Substance and Sexual Activity    Alcohol use: Not Currently     Comment: weekends    Drug use: Never     Social Drivers of Health      Received from [facility]    Housing Stability        REVIEW OF SYSTEMS:   GENERAL HEALTH: feels well otherwise  SKIN: brown circular patch on the upper back and in the gluteal folds   RESPIRATORY: denies shortness of breath with exertion  CARDIOVASCULAR: denies chest pain on  exertion  GI: denies abdominal pain and denies heartburn  NEURO: denies headaches    EXAM:   /70   Pulse 87   Temp 97.1 °F (36.2 °C) (Temporal)   Resp 16   Wt 283 lb 4 oz (128.5 kg)   SpO2 97%   BMI 40.64 kg/m²   GENERAL: well developed, well nourished,in no apparent distress  SKIN: has raised brown patches on the upper back these are smooth and circular, has areas in the gluteal fold. That are large more confluent and confined ti the gluteal fold  HEENT: atraumatic, normocephalic,ears and throat are clear  NECK: supple,no adenopathy,no bruits  LUNGS: clear to auscultation  CARDIO: RRR without murmur  GI: good BS's,no masses, HSM or tenderness  EXTREMITIES: no cyanosis, clubbing or edema    ASSESSMENT AND PLAN:     Encounter Diagnoses   Name Primary?    Polyarthralgia     Elevated sed rate     Elevated C-reactive protein (CRP)     Urethritis Yes    Acanthosis nigricans, acquired     Body mass index (BMI) 40.0-44.9, adult ([facility])        Orders Placed This Encounter   Procedures    *Venipuncture       Meds & Refills for this Visit:  Requested Prescriptions     Signed Prescriptions Disp Refills    Econazole Nitrate 1 % External Cream 85 g 1     Sig: Apply to the affected area bid For 14 days,       Imaging & Consults:  None     The patient indicates understanding of these issues and agrees to the plan.  The patient is asked to return in with update in 14 days regarding response to cream for rash.

## 2025-07-29 ENCOUNTER — TELEPHONE (OUTPATIENT)
Dept: ORTHOPEDICS CLINIC | Facility: CLINIC | Age: 20
End: 2025-07-29

## 2025-07-29 DIAGNOSIS — M25.561 RIGHT KNEE PAIN, UNSPECIFIED CHRONICITY: Primary | ICD-10-CM

## 2025-07-30 ENCOUNTER — OFFICE VISIT (OUTPATIENT)
Dept: ORTHOPEDICS CLINIC | Facility: CLINIC | Age: 20
End: 2025-07-30
Payer: COMMERCIAL

## 2025-07-30 ENCOUNTER — HOSPITAL ENCOUNTER (OUTPATIENT)
Dept: GENERAL RADIOLOGY | Age: 20
Discharge: HOME | End: 2025-07-30
Attending: PHYSICIAN ASSISTANT

## 2025-07-30 VITALS — HEIGHT: 70 IN | BODY MASS INDEX: 38.65 KG/M2 | WEIGHT: 270 LBS

## 2025-07-30 DIAGNOSIS — M25.561 RIGHT KNEE PAIN, UNSPECIFIED CHRONICITY: ICD-10-CM

## 2025-07-30 DIAGNOSIS — M25.361 KNEE INSTABILITY, RIGHT: Primary | ICD-10-CM

## 2025-07-30 PROCEDURE — 99214 OFFICE O/P EST MOD 30 MIN: CPT | Performed by: PHYSICIAN ASSISTANT

## 2025-07-30 PROCEDURE — 3008F BODY MASS INDEX DOCD: CPT | Performed by: PHYSICIAN ASSISTANT

## 2025-07-30 PROCEDURE — 73564 X-RAY EXAM KNEE 4 OR MORE: CPT | Performed by: PHYSICIAN ASSISTANT

## 2025-08-18 ENCOUNTER — HOSPITAL ENCOUNTER (OUTPATIENT)
Dept: MRI IMAGING | Facility: HOSPITAL | Age: 20
Discharge: HOME | End: 2025-08-18
Attending: PHYSICIAN ASSISTANT

## 2025-08-18 DIAGNOSIS — M25.361 KNEE INSTABILITY, RIGHT: ICD-10-CM

## 2025-08-18 PROCEDURE — 73721 MRI JNT OF LWR EXTRE W/O DYE: CPT | Performed by: PHYSICIAN ASSISTANT

## (undated) NOTE — Clinical Note
Referring:  Miguel Watson DO  [address] [facility],[address]  [facility]      Dear Dr. Danette Landau, DO    Thank you for referring your patient to me for an evaluation. Please see my attached note for my findings and recommendations. Should you have any questions or concerns, please do not hesitate to contact me at the number listed below.       Best Regards,      Jose Valles MD  [address] [address]  [phone]

## (undated) NOTE — LETTER
Date: 3/17/2025  Patient Name:  Satish Jolley Maryann             Address: [address] [place] [address]   [place] [address]    : 2005    Dear Satish Wolf,    Your blood test is normal.  Will wait for your Ultrasound of the  abdomen result. Thank you.     Sincerely yours,     MAGEN Rizzo

## (undated) NOTE — Clinical Note
Dr. Carrillo Colon. Hope you are doing well! Thank you for referring Mr Satish Wolf for rheumatologic evaluation. Please see the discussion portion of my note and let me know if you have any questions.   Diamond Molina, DO [facility] 1/21/2025